# Patient Record
Sex: FEMALE | Race: AMERICAN INDIAN OR ALASKA NATIVE | NOT HISPANIC OR LATINO | Employment: PART TIME | URBAN - NONMETROPOLITAN AREA
[De-identification: names, ages, dates, MRNs, and addresses within clinical notes are randomized per-mention and may not be internally consistent; named-entity substitution may affect disease eponyms.]

---

## 2017-04-20 ENCOUNTER — HOSPITAL ENCOUNTER (OUTPATIENT)
Dept: ULTRASOUND IMAGING | Facility: HOSPITAL | Age: 22
Discharge: HOME | End: 2017-04-20
Attending: FAMILY MEDICINE | Admitting: FAMILY MEDICINE
Payer: COMMERCIAL

## 2017-04-20 DIAGNOSIS — Z34.01 SUPERVISION OF NORMAL FIRST PREGNANCY IN FIRST TRIMESTER: Primary | ICD-10-CM

## 2017-04-20 PROCEDURE — 76801 OB US < 14 WKS SINGLE FETUS: CPT | Mod: TC

## 2017-04-21 DIAGNOSIS — Z34.02 SUPERVISION OF NORMAL FIRST PREGNANCY IN SECOND TRIMESTER: Primary | ICD-10-CM

## 2017-10-02 ENCOUNTER — HOSPITAL ENCOUNTER (EMERGENCY)
Facility: HOSPITAL | Age: 22
End: 2017-10-02
Payer: COMMERCIAL

## 2017-10-02 ENCOUNTER — HOSPITAL ENCOUNTER (OUTPATIENT)
Facility: HOSPITAL | Age: 22
Discharge: HOME | End: 2017-10-02
Attending: OBSTETRICS & GYNECOLOGY | Admitting: OBSTETRICS & GYNECOLOGY
Payer: COMMERCIAL

## 2017-10-02 VITALS
DIASTOLIC BLOOD PRESSURE: 70 MMHG | HEIGHT: 68 IN | BODY MASS INDEX: 44.41 KG/M2 | SYSTOLIC BLOOD PRESSURE: 124 MMHG | TEMPERATURE: 97.8 F | RESPIRATION RATE: 20 BRPM | WEIGHT: 293 LBS

## 2017-10-02 PROBLEM — Z36.89 ENCOUNTER FOR TRIAGE IN PREGNANT PATIENT: Status: ACTIVE | Noted: 2017-10-02

## 2017-10-02 LAB
A1 MICROGLOB PLACENTAL VAG QL: POSITIVE
ALBUMIN UR-MCNC: 30 MG/DL
APPEARANCE UR: CLEAR
BACTERIA #/AREA URNS HPF: ABNORMAL /HPF
BILIRUB UR QL STRIP: ABNORMAL
COLOR UR AUTO: ABNORMAL
GLUCOSE UR STRIP-MCNC: NEGATIVE MG/DL
HGB UR QL STRIP: NEGATIVE
KETONES UR STRIP-MCNC: NEGATIVE MG/DL
LEUKOCYTE ESTERASE UR QL STRIP: NEGATIVE
MUCOUS THREADS #/AREA URNS LPF: PRESENT /LPF
NITRATE UR QL: NEGATIVE
PH UR STRIP: 7 PH (ref 4.7–8)
RBC #/AREA URNS AUTO: 1 /HPF (ref 0–2)
SOURCE: ABNORMAL
SP GR UR STRIP: 1.02 (ref 1–1.03)
SQUAMOUS #/AREA URNS AUTO: 12 /HPF (ref 0–1)
UROBILINOGEN UR STRIP-MCNC: NORMAL MG/DL (ref 0–2)
WBC #/AREA URNS AUTO: 10 /HPF (ref 0–2)

## 2017-10-02 PROCEDURE — 99214 OFFICE O/P EST MOD 30 MIN: CPT

## 2017-10-02 PROCEDURE — 99213 OFFICE O/P EST LOW 20 MIN: CPT

## 2017-10-02 PROCEDURE — 84112 EVAL AMNIOTIC FLUID PROTEIN: CPT | Performed by: OBSTETRICS & GYNECOLOGY

## 2017-10-02 PROCEDURE — 76815 OB US LIMITED FETUS(S): CPT | Mod: TC

## 2017-10-02 PROCEDURE — 81001 URINALYSIS AUTO W/SCOPE: CPT | Performed by: OBSTETRICS & GYNECOLOGY

## 2017-10-02 NOTE — PLAN OF CARE
Ultrasound done, no fetal heart tones noted.  Dr Lockhart paged to the room at 1350, updated no fetal heart tones, per ultrasound.  Dr Lockhart stated she would come to see pt.   Dr Lockhart in the room at 1405 and updated the patient and significant other of the ultrasound results.  Dr Lockhart stated she spoke with Dr Mancuso, pt's primary MD.  Dr Lockhart stated she called Dr Mancuso and updated her of pt's status and Dr Mancuso said to send pt to [place].  Pt discharged and accompanied downstairs by myself and pt's family at 1414.  Pt offered a wheelchair, but declined.  This writer called [facility] and updated Jaleesa of pt's status and that she left our facility at 1414.

## 2017-10-02 NOTE — PLAN OF CARE
OB Triage Note  Johanny Infante  MRN: [id number]  Gestational Age: 38 5/7  Johanny Infante presents for  Abdominal pain that is occurring every 7-8 minutes (sign/symptom/concern).      States anup since 1130.  Rates pain at  10/10.  Pt declines any vaginal bleeding, is unsure if she is leaking fluid.  Dr Lockhart notified of arrival and condition.  Oriented patient to surroundings. Call light within reach.     FHT: difficulty finding FHTs, continually adjusting monitor and unable to detect FHTs, another RN in the room and attempted.  Dr Lockhart paged and updated.  Order obtained for an ultrasound.  Ultrasound department called and stated they will be up to see    Uterine Assessment:Contractions: occurring every 2-7minutes, lasting 50-70 seconds.  Contractions palpate moderate.     Plan:  - Awaiting U/S, plan to do a BPP as well  -Sterile vaginal exam/sterile speculum exam

## 2017-10-02 NOTE — IP AVS SNAPSHOT
[facility]    [address] [address]    Phone:  [phone]    Fax:  [phone]                                       After Visit Summary   10/2/2017    Johanny Infante    MRN: [id number]           After Visit Summary Signature Page     I have received my discharge instructions, and my questions have been answered. I have discussed any challenges I see with this plan with the nurse or doctor.    ..........................................................................................................................................  Patient/Patient Representative Signature      ..........................................................................................................................................  Patient Representative Print Name and Relationship to Patient    ..................................................               ................................................  Date                                            Time    ..........................................................................................................................................  Reviewed by Signature/Title    ...................................................              ..............................................  Date                                                            Time

## 2017-10-02 NOTE — IP AVS SNAPSHOT
"                  MRN:[id number]                      After Visit Summary   10/2/2017    Johanny Infante    MRN: [id number]           Thank you!     Thank you for choosing [facility] for your care. Our goal is always to provide you with excellent care. Hearing back from our patients is one way we can continue to improve our services. Please take a few minutes to complete the written survey that you may receive in the mail after you visit with us. Thank you!        Patient Information     Date Of Birth          1995        About your hospital stay     You were admitted on:  October 2, 2017 You last received care in the:  [facility]    You were discharged on:  October 2, 2017       Who to Call     For medical emergencies, please call 911.  For non-urgent questions about your medical care, please call your primary care provider or clinic, [phone]          Attending Provider     Provider Specialty    Jessica Lockhart MD OB/Gyn       Primary Care Provider Office Phone # Fax #    Alesia Benton [phone]      Pending Results     Date and Time Order Name Status Description    10/2/2017 1312 US OB Limited One Or More Fetuses In process             Admission Information     Date & Time Provider Department Dept. Phone    10/2/2017 Jessica Lockhart MD [facility] [phone]      Your Vitals Were     Blood Pressure Temperature Respirations Height Weight BMI (Body Mass Index)    124/70 97.8  F (36.6  C) (Axillary) 20 1.727 m (5' 8\") 141.5 kg (312 lb) 47.44 kg/m2      [facility] [employer] Information     [employer] lets you send messages to your doctor, view your test results, renew your prescriptions, schedule appointments and more. To sign up, go to [email or web address] . Click on \"Log in\" on the left side of the screen, which will take you to the Welcome page. Then click on \"Sign up Now\" on the right side of the page.     You will be asked to enter the access code listed below, as " well as some personal information. Please follow the directions to create your username and password.     Your access code is: [email or web address]  Expires: 2017  2:09 PM     Your access code will  in 90 days. If you need help or a new code, please call your [place] clinic or [phone].        Care EveryWhere ID     This is your Care EveryWhere ID. This could be used by other organizations to access your [place] medical records  [id number]        Equal Access to Services     KEITH ESTES : Hadii haily alcazar hadasho Soomaali, waaxda luqadaha, qaybta kaalmada adeegyada, salas jimenez . So [facility] [phone].    ATENCIÓN: Si habla español, tiene a laird disposición servicios gratuitos de asistencia lingüística. Llame al [phone].    We comply with applicable federal civil rights laws and [place] laws. We do not discriminate on the basis of race, color, national origin, age, disability, sex, sexual orientation, or gender identity.               Review of your medicines      UNREVIEWED medicines. Ask your doctor about these medicines        Dose / Directions    ibuprofen 200 MG capsule        Dose:  800 mg   Take 800 mg by mouth every 8 hours as needed   Refills:  0       KEFLEX PO        Dose:  500 mg   Take 500 mg by mouth 4 times daily Pt unsure of dosage and has not started yet   Refills:  0       ketorolac 10 MG tablet   Commonly known as:  TORADOL        Dose:  10 mg   Take 1 tablet (10 mg) by mouth every 6 hours as needed for moderate pain   Quantity:  20 tablet   Refills:  0       oxyCODONE-acetaminophen 5-325 MG per tablet   Commonly known as:  PERCOCET        Dose:  1-2 tablet   Take 1-2 tablets by mouth every 4 hours as needed for moderate to severe pain   Quantity:  30 tablet   Refills:  0                Protect others around you: Learn how to safely use, store and throw away your medicines at www.disposemymeds.org.             Medication List: This is a list of all your  medications and when to take them. Check marks below indicate your daily home schedule. Keep this list as a reference.      Medications           Morning Afternoon Evening Bedtime As Needed    ibuprofen 200 MG capsule   Take 800 mg by mouth every 8 hours as needed                                KEFLEX PO   Take 500 mg by mouth 4 times daily Pt unsure of dosage and has not started yet                                ketorolac 10 MG tablet   Commonly known as:  TORADOL   Take 1 tablet (10 mg) by mouth every 6 hours as needed for moderate pain                                oxyCODONE-acetaminophen 5-325 MG per tablet   Commonly known as:  PERCOCET   Take 1-2 tablets by mouth every 4 hours as needed for moderate to severe pain                                          More Information        Stillbirth  Stillbirth is when a baby dies in the womb after 20 weeks or more of growing.  What causes stillbirth?  Stillbirth can be caused by many things, such as:    Diabetes or high blood pressure in the mother    An infection in the mother or in the baby    Birth defects because of genetic problems or other causes    Fetal growth restriction    Mismatched blood proteins between mother and baby (Rh disease)    A problem with the umbilical cord. This includes knots, a too-tight cord, a cord wrapped around the baby s body or neck, or the cord dropping through the open cervix after the membranes have ruptured (cord prolapse).    A problem with the placenta, such as poor blood supply, or a shared placenta between twins (twin-to-twin transfusion)  Symptoms of stillbirth  Some symptoms of stillbirth can include:    Stopping of the baby s movement and kicks    Light to heavy bleeding    No baby heartbeat heard with a stethoscope or Doppler  Diagnosing stillbirth  Stillbirth is diagnosed with an ultrasound test. The test shows lack of movement and heartbeat of the baby. The ultrasound test may also help the healthcare provider understand  why the baby . Blood tests may also be done to see what caused the stillbirth. The placenta and baby may be examined after delivery to learn more about the cause.  Treatment for stillbirth  Treatment of a woman after stillbirth varies. It depends on factors such as how long the baby has been in the womb, the size of the baby, and how much time has passed since the baby s heartbeat stopped. Treatment may be done by one of these methods:    Waiting for labor to happen on its own    Dilating the cervix and using tools to deliver the baby    Inducing labor using medicine to open the cervix and cause the uterus to contract and deliver the baby  Coping with stillbirth  Stillbirth is very difficult for the parents, and often other family members. It can be more upsetting than an early miscarriage because the mother has felt the baby move. It can be very hard to go through labor, yet not have a baby to take home. Counseling is important for all parents coping with a stillbirth. It can help you understand your feelings and begin the work of grieving. Talk with your healthcare provider to be referred to a counselor with experience in pregnancy loss.  Grieving the loss of your child  Give yourself time to grieve the loss of your baby. There are ways to help you move through the grieving process. You may wish to hold and touch the baby. In a private room, a nurse or counselor will bring the baby to you, wrapped in a blanket. This can give you a real memory of your baby. Seeing your baby can be helpful when there is a birth defect. Sometimes, a parent may imagine a birth defect as much worse than the real problem. You may also wish to take photographs or footprints of your baby, or take a lock of hair to keep. You may wish to remember the baby with a memorial or  service. This can also help friends and other family members understand the loss that you have experienced.  Learning more about the cause of death  Some  parents may wish to learn more about the cause of their baby's death. An autopsy or special genetic and chromosomal testing may be options. Talk with your healthcare provider. Results can be shared at a meeting with your healthcare provider several weeks afterward. Autopsy does not prevent you from being able to see or hold the baby, and can be done before a .     When to call your healthcare provider  Call your healthcare provider right away if you have any of these:    Fever of 100.4 F (38 C) or higher    Pain or bleeding    Symptoms of depression    Other symptoms as advised   Date Last Reviewed: 2016-2017 [employer]. [address] [place] [address], [place] [address]. All rights reserved. This information is not intended as a substitute for professional medical care. Always follow your healthcare professional's instructions.

## 2017-11-20 ENCOUNTER — HOSPITAL ENCOUNTER (EMERGENCY)
Facility: HOSPITAL | Age: 22
Discharge: LEFT BEFORE DISCHARGE | End: 2017-11-20
Admitting: PHYSICIAN ASSISTANT
Payer: COMMERCIAL

## 2017-11-20 VITALS
WEIGHT: 260 LBS | DIASTOLIC BLOOD PRESSURE: 88 MMHG | RESPIRATION RATE: 20 BRPM | OXYGEN SATURATION: 100 % | SYSTOLIC BLOOD PRESSURE: 138 MMHG | BODY MASS INDEX: 39.4 KG/M2 | TEMPERATURE: 97.6 F | HEIGHT: 68 IN

## 2017-11-20 PROCEDURE — 40000268 ZZH STATISTIC NO CHARGES

## 2019-03-18 ENCOUNTER — HOSPITAL ENCOUNTER (EMERGENCY)
Facility: HOSPITAL | Age: 24
Discharge: HOME | End: 2019-03-18
Attending: NURSE PRACTITIONER | Admitting: NURSE PRACTITIONER
Payer: COMMERCIAL

## 2019-03-18 VITALS
OXYGEN SATURATION: 97 % | TEMPERATURE: 98.6 F | RESPIRATION RATE: 16 BRPM | DIASTOLIC BLOOD PRESSURE: 86 MMHG | SYSTOLIC BLOOD PRESSURE: 141 MMHG

## 2019-03-18 DIAGNOSIS — T50.905A ADVERSE EFFECT OF DRUG, INITIAL ENCOUNTER: ICD-10-CM

## 2019-03-18 DIAGNOSIS — S93.402A SPRAIN OF LEFT ANKLE, UNSPECIFIED LIGAMENT, INITIAL ENCOUNTER: ICD-10-CM

## 2019-03-18 PROCEDURE — G0463 HOSPITAL OUTPT CLINIC VISIT: HCPCS

## 2019-03-18 PROCEDURE — 99203 OFFICE O/P NEW LOW 30 MIN: CPT | Mod: Z6 | Performed by: NURSE PRACTITIONER

## 2019-03-18 RX ORDER — ESCITALOPRAM OXALATE 20 MG/1
20 TABLET ORAL DAILY
COMMUNITY
End: 2020-07-19

## 2019-03-18 NOTE — DISCHARGE INSTRUCTIONS
Follow up with Dr. Mancuso tomorrow about Lovenox (before your next dose is due) and let her know the itching and rash is suspicious for a reaction to the Lovenox.  You may take 1-2 over the counter Benadryl tonight for itching.  Follow up with Dr. Benton if ankle pain is not improving after 2-3 days.  Use crutches, and avoid bearing weight on left ankle.  Keep left ankle elevated and use ice for swelling.  You may take Tylenol as needed for pain.

## 2019-03-18 NOTE — ED AVS SNAPSHOT
[facility]  [address]  [place] [address]  Phone:  [phone]                                    Johanny Infante   MRN: [id number]    Department:  [facility]   Date of Visit:  3/18/2019           After Visit Summary Signature Page    I have received my discharge instructions, and my questions have been answered. I have discussed any challenges I see with this plan with the nurse or doctor.    ..........................................................................................................................................  Patient/Patient Representative Signature      ..........................................................................................................................................  Patient Representative Print Name and Relationship to Patient    ..................................................               ................................................  Date                                   Time    ..........................................................................................................................................  Reviewed by Signature/Title    ...................................................              ..............................................  Date                                               Time          22EPIC Rev 08/18

## 2019-03-18 NOTE — ED TRIAGE NOTES
"C/o possible allergic to reaction to her lovenox injection. States started about a week ago. And she is having \"itching hot skin\". Also c/o left ankle pain, states twisted it on her way here, states \"I think it's fine, it's just sore\"  "

## 2019-03-18 NOTE — ED PROVIDER NOTES
"  History     Chief Complaint   Patient presents with     Ankle Pain     c/o lt ankle pain due to slip and fall     Allergic Reaction     concerned about a possible allergic reaction due to itchy and has a h/a     The history is provided by the patient. No  was used.     Johanny Infante is a 23 year old female who was started on Lovenox by her OB about 1 week ago as a preventative due to history of stillbirth and \"one of the factors for Lupus.\" She has had an \"under the skin prickly itch\" since starting it. The itching is worse right after taking a dose. No rash or hives, although her skin gets red from scratching. She has a headache after each dose as well. She has been trying lotion (hand moisturizing lotion), which doesn't help. Entire body is pruritic.    On her way into  today, she slipped and fell, everting her left ankle. She has pain and swelling, but is able to bear weight.     She has been feeling her baby move as usual. No abdominal pain or cramping.    Allergies:  No Known Allergies    Problem List:    Patient Active Problem List    Diagnosis Date Noted     Encounter for triage in pregnant patient 10/02/2017     Priority: Medium     Uncontrolled pain 05/19/2015     Priority: Medium     Tobacco abuse      Priority: Medium     Obesity      Priority: Medium        Past Medical History:    Past Medical History:   Diagnosis Date     Obesity      Tobacco abuse        Past Surgical History:    Past Surgical History:   Procedure Laterality Date     ENT SURGERY      tonsils and adnoids     wisdom teeth[         Family History:    Family History   Problem Relation Age of Onset     Diabetes Mother        Social History:  Marital Status:  Single [1]  Social History     Tobacco Use     Smoking status: Current Every Day Smoker     Packs/day: 0.40     Smokeless tobacco: Never Used   Substance Use Topics     Alcohol use: No     Drug use: No        Medications:      aspirin (ASPIRIN LOW DOSE) 81 MG " "tablet   enoxaparin (LOVENOX) 30 MG/0.3ML syringe   escitalopram (LEXAPRO) 20 MG tablet         Review of Systems   Constitutional: Negative for fever.   Respiratory: Negative for shortness of breath.    Cardiovascular: Negative for chest pain.   Gastrointestinal: Negative for abdominal pain.   Musculoskeletal:        Positive for left ankle pain   Skin: Positive for rash.   Neurological: Positive for headaches. Negative for dizziness, syncope and light-headedness.   All other systems reviewed and are negative.      Physical Exam   BP: 141/86  Heart Rate: 94  Temp: 98.6  F (37  C)  Resp: 16  SpO2: 97 %      Physical Exam   Constitutional: She is oriented to person, place, and time. She appears well-developed and well-nourished. No distress.   HENT:   Head: Normocephalic and atraumatic.   Eyes: No scleral icterus.   Neck: Normal range of motion. Neck supple.   Musculoskeletal:        Left ankle: She exhibits decreased range of motion, swelling and ecchymosis. She exhibits no deformity and normal pulse. Tenderness. Lateral malleolus tenderness found.   Tenderness and bruising inferior to lateral malleolus.   Neurological: She is alert and oriented to person, place, and time.   Skin: Skin is warm and dry. Rash noted. Rash is urticarial. She is not diaphoretic. No erythema. No pallor.       ED Course        Procedures    Johanny declines an X-ray today due to pregnancy, states, \"I just don't want to take the chance.\"     Left ankle sprain vs fracture, although Johanny is able to bear weight. Firm stirrup splint placed and crutches given. Advised to avoid weight bearing, elevate and ice to ankle. Follow up with PCP if ankle pain is not improving after 2-3 days, sooner if worsening. Johanny is in agreement with the plan and is discharged to home in stable condition.    No results found for this or any previous visit (from the past 24 hour(s)).    Medications - No data to display    Assessments & Plan (with Medical Decision " Making)     I have reviewed the nursing notes.    I have reviewed the findings, diagnosis, plan and need for follow up with the patient.   Rash appears urticarial. Advised to take a dose of Benadryl at home tonight for pruritis, and to contact OB provider tomorrow morning before next dose of Lovenox to discuss options.     Left ankle sprain vs fracture.        Medication List      There are no discharge medications for this visit.         Final diagnoses:   Sprain of left ankle, unspecified ligament, initial encounter   Adverse effect of drug, initial encounter       3/18/2019   [facility]     Luz Camarillo, APRN CNP  03/19/19 2149

## 2019-03-19 ASSESSMENT — ENCOUNTER SYMPTOMS
SHORTNESS OF BREATH: 0
LIGHT-HEADEDNESS: 0
DIZZINESS: 0
FEVER: 0
ABDOMINAL PAIN: 0
HEADACHES: 1

## 2019-06-03 ENCOUNTER — APPOINTMENT (OUTPATIENT)
Dept: CT IMAGING | Facility: HOSPITAL | Age: 24
End: 2019-06-03
Attending: PHYSICIAN ASSISTANT
Payer: COMMERCIAL

## 2019-06-03 ENCOUNTER — HOSPITAL ENCOUNTER (EMERGENCY)
Facility: HOSPITAL | Age: 24
Discharge: HOME | End: 2019-06-03
Attending: PHYSICIAN ASSISTANT | Admitting: PHYSICIAN ASSISTANT
Payer: COMMERCIAL

## 2019-06-03 ENCOUNTER — HOSPITAL ENCOUNTER (INPATIENT)
Facility: HOSPITAL | Age: 24
LOS: 2 days | Discharge: HOME | End: 2019-06-06
Attending: FAMILY MEDICINE | Admitting: OBSTETRICS & GYNECOLOGY
Payer: COMMERCIAL

## 2019-06-03 VITALS
SYSTOLIC BLOOD PRESSURE: 116 MMHG | RESPIRATION RATE: 16 BRPM | TEMPERATURE: 99.4 F | OXYGEN SATURATION: 97 % | HEART RATE: 117 BPM | DIASTOLIC BLOOD PRESSURE: 64 MMHG

## 2019-06-03 DIAGNOSIS — R52 INTRACTABLE PAIN: ICD-10-CM

## 2019-06-03 DIAGNOSIS — Z98.890 POSTOPERATIVE STATE: ICD-10-CM

## 2019-06-03 DIAGNOSIS — N93.9 VAGINAL BLEEDING: ICD-10-CM

## 2019-06-03 DIAGNOSIS — N71.9 ENDOMETRITIS: Primary | ICD-10-CM

## 2019-06-03 LAB
ALBUMIN SERPL-MCNC: 2.6 G/DL (ref 3.4–5)
ALBUMIN UR-MCNC: NEGATIVE MG/DL
ALP SERPL-CCNC: 177 U/L (ref 40–150)
ALT SERPL W P-5'-P-CCNC: 27 U/L (ref 0–50)
ANION GAP SERPL CALCULATED.3IONS-SCNC: 8 MMOL/L (ref 3–14)
APPEARANCE UR: CLEAR
AST SERPL W P-5'-P-CCNC: 14 U/L (ref 0–45)
BACTERIA #/AREA URNS HPF: ABNORMAL /HPF
BASOPHILS # BLD AUTO: 0 10E9/L (ref 0–0.2)
BASOPHILS # BLD AUTO: 0 10E9/L (ref 0–0.2)
BASOPHILS NFR BLD AUTO: 0.1 %
BASOPHILS NFR BLD AUTO: 0.2 %
BILIRUB SERPL-MCNC: 0.3 MG/DL (ref 0.2–1.3)
BILIRUB UR QL STRIP: NEGATIVE
BUN SERPL-MCNC: 14 MG/DL (ref 7–30)
CALCIUM SERPL-MCNC: 8.3 MG/DL (ref 8.5–10.1)
CHLORIDE SERPL-SCNC: 106 MMOL/L (ref 94–109)
CO2 SERPL-SCNC: 25 MMOL/L (ref 20–32)
COLOR UR AUTO: ABNORMAL
CREAT SERPL-MCNC: 0.74 MG/DL (ref 0.52–1.04)
DIFFERENTIAL METHOD BLD: ABNORMAL
DIFFERENTIAL METHOD BLD: ABNORMAL
EOSINOPHIL # BLD AUTO: 0.2 10E9/L (ref 0–0.7)
EOSINOPHIL # BLD AUTO: 0.3 10E9/L (ref 0–0.7)
EOSINOPHIL NFR BLD AUTO: 1.2 %
EOSINOPHIL NFR BLD AUTO: 2.4 %
ERYTHROCYTE [DISTWIDTH] IN BLOOD BY AUTOMATED COUNT: 13.5 % (ref 10–15)
ERYTHROCYTE [DISTWIDTH] IN BLOOD BY AUTOMATED COUNT: 13.6 % (ref 10–15)
GFR SERPL CREATININE-BSD FRML MDRD: >90 ML/MIN/{1.73_M2}
GLUCOSE SERPL-MCNC: 90 MG/DL (ref 70–99)
GLUCOSE UR STRIP-MCNC: NEGATIVE MG/DL
HCT VFR BLD AUTO: 28.3 % (ref 35–47)
HCT VFR BLD AUTO: 29.8 % (ref 35–47)
HGB BLD-MCNC: 9.2 G/DL (ref 11.7–15.7)
HGB BLD-MCNC: 9.6 G/DL (ref 11.7–15.7)
HGB UR QL STRIP: ABNORMAL
IMM GRANULOCYTES # BLD: 0.1 10E9/L (ref 0–0.4)
IMM GRANULOCYTES # BLD: 0.1 10E9/L (ref 0–0.4)
IMM GRANULOCYTES NFR BLD: 0.7 %
IMM GRANULOCYTES NFR BLD: 0.7 %
KETONES UR STRIP-MCNC: NEGATIVE MG/DL
LACTATE BLD-SCNC: 0.8 MMOL/L (ref 0.7–2)
LEUKOCYTE ESTERASE UR QL STRIP: ABNORMAL
LYMPHOCYTES # BLD AUTO: 0.9 10E9/L (ref 0.8–5.3)
LYMPHOCYTES # BLD AUTO: 1.5 10E9/L (ref 0.8–5.3)
LYMPHOCYTES NFR BLD AUTO: 11.5 %
LYMPHOCYTES NFR BLD AUTO: 6.8 %
MCH RBC QN AUTO: 28.2 PG (ref 26.5–33)
MCH RBC QN AUTO: 28.2 PG (ref 26.5–33)
MCHC RBC AUTO-ENTMCNC: 32.2 G/DL (ref 31.5–36.5)
MCHC RBC AUTO-ENTMCNC: 32.5 G/DL (ref 31.5–36.5)
MCV RBC AUTO: 87 FL (ref 78–100)
MCV RBC AUTO: 88 FL (ref 78–100)
MONOCYTES # BLD AUTO: 0.6 10E9/L (ref 0–1.3)
MONOCYTES # BLD AUTO: 0.9 10E9/L (ref 0–1.3)
MONOCYTES NFR BLD AUTO: 4.7 %
MONOCYTES NFR BLD AUTO: 6.6 %
MUCOUS THREADS #/AREA URNS LPF: PRESENT /LPF
NEUTROPHILS # BLD AUTO: 10.6 10E9/L (ref 1.6–8.3)
NEUTROPHILS # BLD AUTO: 11.4 10E9/L (ref 1.6–8.3)
NEUTROPHILS NFR BLD AUTO: 78.7 %
NEUTROPHILS NFR BLD AUTO: 86.4 %
NITRATE UR QL: NEGATIVE
NRBC # BLD AUTO: 0 10*3/UL
NRBC # BLD AUTO: 0 10*3/UL
NRBC BLD AUTO-RTO: 0 /100
NRBC BLD AUTO-RTO: 0 /100
PH UR STRIP: 7 PH (ref 4.7–8)
PLATELET # BLD AUTO: 397 10E9/L (ref 150–450)
PLATELET # BLD AUTO: 429 10E9/L (ref 150–450)
POTASSIUM SERPL-SCNC: 4.1 MMOL/L (ref 3.4–5.3)
PROCALCITONIN SERPL-MCNC: <0.05 NG/ML
PROT SERPL-MCNC: 6.8 G/DL (ref 6.8–8.8)
RBC # BLD AUTO: 3.26 10E12/L (ref 3.8–5.2)
RBC # BLD AUTO: 3.4 10E12/L (ref 3.8–5.2)
RBC #/AREA URNS AUTO: 12 /HPF (ref 0–2)
SODIUM SERPL-SCNC: 139 MMOL/L (ref 133–144)
SOURCE: ABNORMAL
SP GR UR STRIP: 1.01 (ref 1–1.03)
UROBILINOGEN UR STRIP-MCNC: NORMAL MG/DL (ref 0–2)
WBC # BLD AUTO: 13.2 10E9/L (ref 4–11)
WBC # BLD AUTO: 13.4 10E9/L (ref 4–11)
WBC #/AREA URNS AUTO: 6 /HPF (ref 0–5)

## 2019-06-03 PROCEDURE — 83605 ASSAY OF LACTIC ACID: CPT | Performed by: PHYSICIAN ASSISTANT

## 2019-06-03 PROCEDURE — 36415 COLL VENOUS BLD VENIPUNCTURE: CPT | Performed by: PHYSICIAN ASSISTANT

## 2019-06-03 PROCEDURE — 86901 BLOOD TYPING SEROLOGIC RH(D): CPT | Performed by: FAMILY MEDICINE

## 2019-06-03 PROCEDURE — 85025 COMPLETE CBC W/AUTO DIFF WBC: CPT | Performed by: FAMILY MEDICINE

## 2019-06-03 PROCEDURE — 99285 EMERGENCY DEPT VISIT HI MDM: CPT | Mod: Z6 | Performed by: FAMILY MEDICINE

## 2019-06-03 PROCEDURE — 99285 EMERGENCY DEPT VISIT HI MDM: CPT | Mod: 25

## 2019-06-03 PROCEDURE — 83605 ASSAY OF LACTIC ACID: CPT | Performed by: FAMILY MEDICINE

## 2019-06-03 PROCEDURE — 80053 COMPREHEN METABOLIC PANEL: CPT | Performed by: PHYSICIAN ASSISTANT

## 2019-06-03 PROCEDURE — 87086 URINE CULTURE/COLONY COUNT: CPT | Performed by: PHYSICIAN ASSISTANT

## 2019-06-03 PROCEDURE — 25000128 H RX IP 250 OP 636: Performed by: PHYSICIAN ASSISTANT

## 2019-06-03 PROCEDURE — 96375 TX/PRO/DX INJ NEW DRUG ADDON: CPT

## 2019-06-03 PROCEDURE — 96365 THER/PROPH/DIAG IV INF INIT: CPT | Mod: XU

## 2019-06-03 PROCEDURE — 86900 BLOOD TYPING SEROLOGIC ABO: CPT | Performed by: FAMILY MEDICINE

## 2019-06-03 PROCEDURE — 80053 COMPREHEN METABOLIC PANEL: CPT | Performed by: FAMILY MEDICINE

## 2019-06-03 PROCEDURE — 86850 RBC ANTIBODY SCREEN: CPT | Performed by: FAMILY MEDICINE

## 2019-06-03 PROCEDURE — 25500064 ZZH RX 255 OP 636: Performed by: RADIOLOGY

## 2019-06-03 PROCEDURE — 74177 CT ABD & PELVIS W/CONTRAST: CPT | Mod: TC

## 2019-06-03 PROCEDURE — 87040 BLOOD CULTURE FOR BACTERIA: CPT | Mod: 59 | Performed by: PHYSICIAN ASSISTANT

## 2019-06-03 PROCEDURE — 25800030 ZZH RX IP 258 OP 636: Performed by: PHYSICIAN ASSISTANT

## 2019-06-03 PROCEDURE — 85025 COMPLETE CBC W/AUTO DIFF WBC: CPT | Performed by: PHYSICIAN ASSISTANT

## 2019-06-03 PROCEDURE — 81001 URINALYSIS AUTO W/SCOPE: CPT | Performed by: PHYSICIAN ASSISTANT

## 2019-06-03 PROCEDURE — 25000128 H RX IP 250 OP 636: Performed by: FAMILY MEDICINE

## 2019-06-03 PROCEDURE — 84145 PROCALCITONIN (PCT): CPT | Performed by: PHYSICIAN ASSISTANT

## 2019-06-03 RX ORDER — AMPICILLIN AND SULBACTAM 2; 1 G/1; G/1
3 INJECTION, POWDER, FOR SOLUTION INTRAMUSCULAR; INTRAVENOUS ONCE
Status: DISCONTINUED | OUTPATIENT
Start: 2019-06-03 | End: 2019-06-03

## 2019-06-03 RX ORDER — OXYCODONE HYDROCHLORIDE 5 MG/1
5-10 TABLET ORAL
COMMUNITY
Start: 2019-05-30 | End: 2020-07-19

## 2019-06-03 RX ORDER — SODIUM CHLORIDE 9 MG/ML
1000 INJECTION, SOLUTION INTRAVENOUS CONTINUOUS
Status: DISCONTINUED | OUTPATIENT
Start: 2019-06-03 | End: 2019-06-03 | Stop reason: HOSPADM

## 2019-06-03 RX ORDER — KETOROLAC TROMETHAMINE 30 MG/ML
30 INJECTION, SOLUTION INTRAMUSCULAR; INTRAVENOUS ONCE
Status: COMPLETED | OUTPATIENT
Start: 2019-06-03 | End: 2019-06-03

## 2019-06-03 RX ORDER — IOPAMIDOL 612 MG/ML
100 INJECTION, SOLUTION INTRAVASCULAR ONCE
Status: COMPLETED | OUTPATIENT
Start: 2019-06-03 | End: 2019-06-03

## 2019-06-03 RX ORDER — SODIUM CHLORIDE 9 MG/ML
1000 INJECTION, SOLUTION INTRAVENOUS CONTINUOUS
Status: DISCONTINUED | OUTPATIENT
Start: 2019-06-03 | End: 2019-06-06 | Stop reason: HOSPADM

## 2019-06-03 RX ADMIN — IOPAMIDOL 100 ML: 612 INJECTION, SOLUTION INTRAVENOUS at 11:59

## 2019-06-03 RX ADMIN — SODIUM CHLORIDE 1000 ML: 9 INJECTION, SOLUTION INTRAVENOUS at 23:59

## 2019-06-03 RX ADMIN — SODIUM CHLORIDE 1000 ML: 9 INJECTION, SOLUTION INTRAVENOUS at 10:38

## 2019-06-03 RX ADMIN — KETOROLAC TROMETHAMINE 30 MG: 30 INJECTION, SOLUTION INTRAMUSCULAR; INTRAVENOUS at 11:49

## 2019-06-03 RX ADMIN — SODIUM CHLORIDE 1000 ML: 9 INJECTION, SOLUTION INTRAVENOUS at 11:41

## 2019-06-03 RX ADMIN — SODIUM CHLORIDE 3 G: 9 INJECTION, SOLUTION INTRAVENOUS at 12:23

## 2019-06-03 ASSESSMENT — ENCOUNTER SYMPTOMS
NEUROLOGICAL NEGATIVE: 1
ABDOMINAL PAIN: 1
RESPIRATORY NEGATIVE: 1
BRUISES/BLEEDS EASILY: 1
CHILLS: 1
CARDIOVASCULAR NEGATIVE: 1
APPETITE CHANGE: 0
NAUSEA: 0
PSYCHIATRIC NEGATIVE: 1
EYES NEGATIVE: 1
FATIGUE: 0
DIAPHORESIS: 0
ACTIVITY CHANGE: 0
FEVER: 1
VOMITING: 0

## 2019-06-03 NOTE — ED AVS SNAPSHOT
[facility]  [address]  [place] [address]  Phone:  [phone]                                    Johanny Infante   MRN: [id number]    Department:  [facility]   Date of Visit:  6/3/2019           After Visit Summary Signature Page    I have received my discharge instructions, and my questions have been answered. I have discussed any challenges I see with this plan with the nurse or doctor.    ..........................................................................................................................................  Patient/Patient Representative Signature      ..........................................................................................................................................  Patient Representative Print Name and Relationship to Patient    ..................................................               ................................................  Date                                   Time    ..........................................................................................................................................  Reviewed by Signature/Title    ...................................................              ..............................................  Date                                               Time          22EPIC Rev 08/18

## 2019-06-03 NOTE — ED NOTES
Ambulated to room 6 independently, gown placed, call light in reach.   in [place] on .  This morning noted some bleeding and drainage from incision site.  Fever today in triage.  Lower abd pain, rates pain at 8, pain goal is zero.  Intermittent itching at incision site.  Headache and body aches.  Breast feeding, denies problem with breast feeding.  Daily Lovenox injection, clotting disorder.  Tylenol 1000 mg at 0100, Ibuprofen 800 mg at 0600, oxycodone at 0450.

## 2019-06-03 NOTE — ED NOTES
Dr. Herbert Goncalves notified,sepsis alert triggered, stated she will access patient.   Temp 99.0 oral.

## 2019-06-03 NOTE — DISCHARGE INSTRUCTIONS
Start Augmentin tonight with supper.     Please follow-up with your primary care provider or OB tomorrow for recheck.     Please return here for ANY worsening symptoms, new symptoms, or other concerns.

## 2019-06-03 NOTE — ED TRIAGE NOTES
Pt in for complaints of drainage from  incision. Reports she has a  on  and reports no issues on 1 week appt. Reports this am she noticed bloody drainage on her underwear at the incision line.

## 2019-06-03 NOTE — ED PROVIDER NOTES
History     Chief Complaint   Patient presents with     Wound Check     The history is provided by the patient.     Johanny Infante is a 24 year old female who presented to the emergency department ambulatory for evaluation of abdominal pain past medical history significant for  on May 22.  She did well for approximately 1 week postpartum and has recently developed some lower abdominal pain, discharge from the  wound, and a recent fever.  He presented to the emergency department with a fever of 109.9.  Past medical history is most significant for antiphospholipid antibody syndrome currently on Lovenox.  She denies any chest pain or cough.  Denies any shortness of breath.  Denies any lower urinary tract symptoms.  Denies any diarrhea.  Denies any flank pain.    Allergies:  No Known Allergies    Problem List:    Patient Active Problem List    Diagnosis Date Noted     Encounter for triage in pregnant patient 10/02/2017     Priority: Medium     Uncontrolled pain 2015     Priority: Medium     Tobacco abuse      Priority: Medium     Obesity      Priority: Medium        Past Medical History:    Past Medical History:   Diagnosis Date     Obesity      Tobacco abuse        Past Surgical History:    Past Surgical History:   Procedure Laterality Date     ENT SURGERY      tonsils and adnoids     wisdom teeth[         Family History:    Family History   Problem Relation Age of Onset     Diabetes Mother        Social History:  Marital Status:  Single [1]  Social History     Tobacco Use     Smoking status: Current Every Day Smoker     Packs/day: 0.40     Smokeless tobacco: Never Used   Substance Use Topics     Alcohol use: No     Drug use: No        Medications:      amoxicillin-clavulanate (AUGMENTIN) 875-125 MG tablet   enoxaparin (LOVENOX) 30 MG/0.3ML syringe   escitalopram (LEXAPRO) 20 MG tablet   oxyCODONE (ROXICODONE) 5 MG tablet   Prenatal Vit-Fe Fumarate-FA (PRENATAL VITAMIN PO)         Review of  Systems   Constitutional: Positive for chills and fever. Negative for activity change, appetite change, diaphoresis and fatigue.   HENT: Negative.    Eyes: Negative.    Respiratory: Negative.    Cardiovascular: Negative.    Gastrointestinal: Positive for abdominal pain. Negative for nausea and vomiting.   Genitourinary: Negative.    Skin: Negative.    Neurological: Negative.    Hematological: Bruises/bleeds easily.   Psychiatric/Behavioral: Negative.        Physical Exam   BP: 127/76  Pulse: 117  Heart Rate: 98  Temp: (!) 101.9  F (38.8  C)  Resp: 22  SpO2: 99 %      Physical Exam   Constitutional: She is oriented to person, place, and time. She appears well-developed and well-nourished.   HENT:   Head: Normocephalic and atraumatic.   Eyes: Conjunctivae and EOM are normal.   Neck: Normal range of motion. Neck supple.   Cardiovascular: Normal rate and regular rhythm.   Pulmonary/Chest: Effort normal and breath sounds normal.   Abdominal: Soft. She exhibits no distension. There is no tenderness.   Mild lower abdominal tenderness.  The surgical wound looks good there is some slight clear drainage from the wound.  The wound has not dehisced.  There is no induration or evidence cellulitis.   Musculoskeletal: She exhibits no edema.   Neurological: She is alert and oriented to person, place, and time.   Skin: Skin is warm and dry. Capillary refill takes less than 2 seconds.   Psychiatric: She has a normal mood and affect.   Nursing note and vitals reviewed.      ED Course        Procedures               Critical Care time:  none               Results for orders placed or performed during the hospital encounter of 06/03/19 (from the past 24 hour(s))   CBC with platelets differential   Result Value Ref Range    WBC 13.2 (H) 4.0 - 11.0 10e9/L    RBC Count 3.40 (L) 3.8 - 5.2 10e12/L    Hemoglobin 9.6 (L) 11.7 - 15.7 g/dL    Hematocrit 29.8 (L) 35.0 - 47.0 %    MCV 88 78 - 100 fl    MCH 28.2 26.5 - 33.0 pg    MCHC 32.2 31.5 -  36.5 g/dL    RDW 13.6 10.0 - 15.0 %    Platelet Count 397 150 - 450 10e9/L    Diff Method Automated Method     % Neutrophils 86.4 %    % Lymphocytes 6.8 %    % Monocytes 4.7 %    % Eosinophils 1.2 %    % Basophils 0.2 %    % Immature Granulocytes 0.7 %    Nucleated RBCs 0 0 /100    Absolute Neutrophil 11.4 (H) 1.6 - 8.3 10e9/L    Absolute Lymphocytes 0.9 0.8 - 5.3 10e9/L    Absolute Monocytes 0.6 0.0 - 1.3 10e9/L    Absolute Eosinophils 0.2 0.0 - 0.7 10e9/L    Absolute Basophils 0.0 0.0 - 0.2 10e9/L    Abs Immature Granulocytes 0.1 0 - 0.4 10e9/L    Absolute Nucleated RBC 0.0    Comprehensive metabolic panel   Result Value Ref Range    Sodium 139 133 - 144 mmol/L    Potassium 4.1 3.4 - 5.3 mmol/L    Chloride 106 94 - 109 mmol/L    Carbon Dioxide 25 20 - 32 mmol/L    Anion Gap 8 3 - 14 mmol/L    Glucose 90 70 - 99 mg/dL    Urea Nitrogen 14 7 - 30 mg/dL    Creatinine 0.74 0.52 - 1.04 mg/dL    GFR Estimate >90 >60 mL/min/[1.73_m2]    GFR Estimate If Black >90 >60 mL/min/[1.73_m2]    Calcium 8.3 (L) 8.5 - 10.1 mg/dL    Bilirubin Total 0.3 0.2 - 1.3 mg/dL    Albumin 2.6 (L) 3.4 - 5.0 g/dL    Protein Total 6.8 6.8 - 8.8 g/dL    Alkaline Phosphatase 177 (H) 40 - 150 U/L    ALT 27 0 - 50 U/L    AST 14 0 - 45 U/L   Lactic acid whole blood   Result Value Ref Range    Lactic Acid 0.8 0.7 - 2.0 mmol/L   Procalcitonin   Result Value Ref Range    Procalcitonin <0.05 ng/ml   UA reflex to Microscopic   Result Value Ref Range    Color Urine Light Yellow     Appearance Urine Clear     Glucose Urine Negative NEG^Negative mg/dL    Bilirubin Urine Negative NEG^Negative    Ketones Urine Negative NEG^Negative mg/dL    Specific Gravity Urine 1.014 1.003 - 1.035    Blood Urine Small (A) NEG^Negative    pH Urine 7.0 4.7 - 8.0 pH    Protein Albumin Urine Negative NEG^Negative mg/dL    Urobilinogen mg/dL Normal 0.0 - 2.0 mg/dL    Nitrite Urine Negative NEG^Negative    Leukocyte Esterase Urine Moderate (A) NEG^Negative    Source Midstream Urine      RBC Urine 12 (H) 0 - 2 /HPF    WBC Urine 6 (H) 0 - 5 /HPF    Bacteria Urine None (A) NEG^Negative /HPF    Mucous Urine Present (A) NEG^Negative /LPF   CT Abdomen Pelvis w Contrast    Narrative    EXAMINATION: CT ABDOMEN PELVIS W CONTRAST, 6/3/2019 12:09 PM    TECHNIQUE:  Helical CT images from the lung bases through the  symphysis pubis were obtained  with IV contrast. Contrast dose: ISOVUE  300  100ML    COMPARISON: none    HISTORY: fever, lower abdominal pain, c section on 5/22    FINDINGS:    There is dependent atelectasis at the lung bases.    The liver is free of masses or biliary ductal enlargement. The  gallbladder has been removed    The the spleen and pancreas appear normal.    The adrenal glands are normal.    The right and left kidneys are free of masses or hydronephrosis.    The periaortic lymph nodes are normal in caliber.    No intraperitoneal masses or inflammatory changes are noted.    In the pelvis the bladder and rectum appear normal. The uterus is  enlarged due to was recently gravid state.    The regional skeleton is intact postoperative changes are seen in the  anterior abdominal wall. There is some subcutaneous nodules possibly  injection sites seen in the anterior abdominal wall fat.      Impression    IMPRESSION: No intra-abdominal masses or inflammatory changes are seen      ALISIA WALDROP MD       Medications   0.9% sodium chloride BOLUS (0 mLs Intravenous Stopped 6/3/19 1140)     Followed by   sodium chloride 0.9% infusion (0 mLs Intravenous Stopped 6/3/19 1307)   ampicillin-sulbactam (UNASYN) 3 g in sodium chloride 0.9 % 100 mL intermittent infusion (0 g Intravenous Stopped 6/3/19 1255)   ketorolac (TORADOL) injection 30 mg (30 mg Intravenous Given 6/3/19 1149)   sodium chloride (PF) 0.9% PF flush 60 mL (60 mLs Intravenous Given 6/3/19 1159)   iopamidol (ISOVUE-300) IV solution 61% 100 mL (100 mLs Intravenous Given 6/3/19 1159)       Assessments & Plan (with Medical Decision Making)    Work-up as above.  Discussed concerns with the patient.  She looks well.  No current fever.  No tachycardia.  Mild elevated white blood cell count which is obviously nonspecific and a postpartum state.  Lactic acid and procalcitonin are   Normal.  I believe it is certainly reasonable to treat her with IV antibiotics as well as oral Augmentin for the wound discharge as well as ability of endometritis.  Case discussed with OB/GYN on-call Dr. Henderson.  Agreed with the treatment plan.  She will follow-up with her primary care provider or OB/GYN tomorrow for recheck.  I stressed that she return here for any worsening symptoms, new symptoms, or other concerns.    This document was prepared using a combination of typing and voice generated software.  While every attempt was made for accuracy, spelling and grammatical errors may exist.    I have reviewed the nursing notes.    I have reviewed the findings, diagnosis, plan and need for follow up with the patient.          Medication List      Started    amoxicillin-clavulanate 875-125 MG tablet  Commonly known as:  AUGMENTIN  1 tablet, Oral, 2 TIMES DAILY            Final diagnoses:   Postoperative state   Postpartum fever       6/3/2019   [facility]     Lidia Ratliff PA-C  06/03/19 7107

## 2019-06-03 NOTE — ED NOTES
C section wound area lower abd cleaned with sterile gauze, sterile telfa applied to wound area, covered with sterile gauze, secured with paper tape.  Patient instructed on wound care, verbalized an understanding.  Tolerated procedure well.

## 2019-06-04 PROBLEM — N71.9 ENDOMETRITIS: Status: ACTIVE | Noted: 2019-06-04

## 2019-06-04 LAB
ABO + RH BLD: NORMAL
ABO + RH BLD: NORMAL
ALBUMIN SERPL-MCNC: 2.5 G/DL (ref 3.4–5)
ALP SERPL-CCNC: 179 U/L (ref 40–150)
ALT SERPL W P-5'-P-CCNC: 27 U/L (ref 0–50)
ANION GAP SERPL CALCULATED.3IONS-SCNC: 8 MMOL/L (ref 3–14)
AST SERPL W P-5'-P-CCNC: 16 U/L (ref 0–45)
BACTERIA SPEC CULT: NORMAL
BILIRUB SERPL-MCNC: 0.3 MG/DL (ref 0.2–1.3)
BLD GP AB SCN SERPL QL: NORMAL
BLOOD BANK CMNT PATIENT-IMP: NORMAL
BUN SERPL-MCNC: 13 MG/DL (ref 7–30)
CALCIUM SERPL-MCNC: 8.2 MG/DL (ref 8.5–10.1)
CHLORIDE SERPL-SCNC: 110 MMOL/L (ref 94–109)
CO2 SERPL-SCNC: 23 MMOL/L (ref 20–32)
CREAT SERPL-MCNC: 0.78 MG/DL (ref 0.52–1.04)
CREAT SERPL-MCNC: 0.84 MG/DL (ref 0.52–1.04)
GFR SERPL CREATININE-BSD FRML MDRD: >90 ML/MIN/{1.73_M2}
GFR SERPL CREATININE-BSD FRML MDRD: >90 ML/MIN/{1.73_M2}
GLUCOSE SERPL-MCNC: 86 MG/DL (ref 70–99)
HGB BLD-MCNC: 8.2 G/DL (ref 11.7–15.7)
HGB BLD-MCNC: 8.3 G/DL (ref 11.7–15.7)
LACTATE BLD-SCNC: 0.8 MMOL/L (ref 0.7–2)
PLATELET # BLD AUTO: 365 10E9/L (ref 150–450)
POTASSIUM SERPL-SCNC: 4.1 MMOL/L (ref 3.4–5.3)
PROT SERPL-MCNC: 6.7 G/DL (ref 6.8–8.8)
SODIUM SERPL-SCNC: 141 MMOL/L (ref 133–144)
SPECIMEN EXP DATE BLD: NORMAL
SPECIMEN SOURCE: NORMAL

## 2019-06-04 PROCEDURE — 25000128 H RX IP 250 OP 636: Performed by: OBSTETRICS & GYNECOLOGY

## 2019-06-04 PROCEDURE — 99222 1ST HOSP IP/OBS MODERATE 55: CPT | Performed by: OBSTETRICS & GYNECOLOGY

## 2019-06-04 PROCEDURE — 25000132 ZZH RX MED GY IP 250 OP 250 PS 637: Performed by: OBSTETRICS & GYNECOLOGY

## 2019-06-04 PROCEDURE — 25000128 H RX IP 250 OP 636

## 2019-06-04 PROCEDURE — 25800030 ZZH RX IP 258 OP 636: Performed by: FAMILY MEDICINE

## 2019-06-04 PROCEDURE — 25000128 H RX IP 250 OP 636: Performed by: FAMILY MEDICINE

## 2019-06-04 PROCEDURE — 12000000 ZZH R&B MED SURG/OB

## 2019-06-04 PROCEDURE — 96365 THER/PROPH/DIAG IV INF INIT: CPT

## 2019-06-04 PROCEDURE — 85018 HEMOGLOBIN: CPT | Performed by: OBSTETRICS & GYNECOLOGY

## 2019-06-04 PROCEDURE — 82565 ASSAY OF CREATININE: CPT | Performed by: OBSTETRICS & GYNECOLOGY

## 2019-06-04 PROCEDURE — 85049 AUTOMATED PLATELET COUNT: CPT | Performed by: OBSTETRICS & GYNECOLOGY

## 2019-06-04 PROCEDURE — 36415 COLL VENOUS BLD VENIPUNCTURE: CPT | Performed by: OBSTETRICS & GYNECOLOGY

## 2019-06-04 PROCEDURE — 96375 TX/PRO/DX INJ NEW DRUG ADDON: CPT

## 2019-06-04 RX ORDER — SODIUM CHLORIDE 9 MG/ML
INJECTION, SOLUTION INTRAVENOUS
Status: DISCONTINUED
Start: 2019-06-04 | End: 2019-06-04 | Stop reason: HOSPADM

## 2019-06-04 RX ORDER — OXYCODONE AND ACETAMINOPHEN 5; 325 MG/1; MG/1
1-2 TABLET ORAL EVERY 4 HOURS PRN
Status: DISCONTINUED | OUTPATIENT
Start: 2019-06-04 | End: 2019-06-06 | Stop reason: HOSPADM

## 2019-06-04 RX ORDER — NALOXONE HYDROCHLORIDE 0.4 MG/ML
.1-.4 INJECTION, SOLUTION INTRAMUSCULAR; INTRAVENOUS; SUBCUTANEOUS
Status: DISCONTINUED | OUTPATIENT
Start: 2019-06-04 | End: 2019-06-06 | Stop reason: HOSPADM

## 2019-06-04 RX ORDER — AMPICILLIN 2 G/1
INJECTION, POWDER, FOR SOLUTION INTRAVENOUS
Status: DISCONTINUED
Start: 2019-06-04 | End: 2019-06-04

## 2019-06-04 RX ORDER — AMPICILLIN AND SULBACTAM 2; 1 G/1; G/1
3 INJECTION, POWDER, FOR SOLUTION INTRAMUSCULAR; INTRAVENOUS ONCE
Status: DISCONTINUED | OUTPATIENT
Start: 2019-06-04 | End: 2019-06-04

## 2019-06-04 RX ORDER — MORPHINE SULFATE 2 MG/ML
2-4 INJECTION, SOLUTION INTRAMUSCULAR; INTRAVENOUS
Status: DISCONTINUED | OUTPATIENT
Start: 2019-06-04 | End: 2019-06-06 | Stop reason: HOSPADM

## 2019-06-04 RX ORDER — AMPICILLIN 1 G/1
INJECTION, POWDER, FOR SOLUTION INTRAMUSCULAR; INTRAVENOUS
Status: DISCONTINUED
Start: 2019-06-04 | End: 2019-06-04

## 2019-06-04 RX ORDER — ONDANSETRON 2 MG/ML
4 INJECTION INTRAMUSCULAR; INTRAVENOUS EVERY 30 MIN PRN
Status: DISCONTINUED | OUTPATIENT
Start: 2019-06-04 | End: 2019-06-04

## 2019-06-04 RX ORDER — MORPHINE SULFATE 2 MG/ML
INJECTION, SOLUTION INTRAMUSCULAR; INTRAVENOUS
Status: COMPLETED
Start: 2019-06-04 | End: 2019-06-04

## 2019-06-04 RX ORDER — ONDANSETRON 4 MG/1
4 TABLET, ORALLY DISINTEGRATING ORAL EVERY 6 HOURS PRN
Status: DISCONTINUED | OUTPATIENT
Start: 2019-06-04 | End: 2019-06-06 | Stop reason: HOSPADM

## 2019-06-04 RX ORDER — DOCUSATE SODIUM 100 MG/1
100 CAPSULE, LIQUID FILLED ORAL 2 TIMES DAILY
Status: DISCONTINUED | OUTPATIENT
Start: 2019-06-04 | End: 2019-06-06 | Stop reason: HOSPADM

## 2019-06-04 RX ORDER — ONDANSETRON 2 MG/ML
INJECTION INTRAMUSCULAR; INTRAVENOUS
Status: COMPLETED
Start: 2019-06-04 | End: 2019-06-04

## 2019-06-04 RX ORDER — IBUPROFEN 600 MG/1
600 TABLET, FILM COATED ORAL EVERY 6 HOURS PRN
Status: DISCONTINUED | OUTPATIENT
Start: 2019-06-04 | End: 2019-06-06 | Stop reason: HOSPADM

## 2019-06-04 RX ORDER — ONDANSETRON 2 MG/ML
4 INJECTION INTRAMUSCULAR; INTRAVENOUS EVERY 6 HOURS PRN
Status: DISCONTINUED | OUTPATIENT
Start: 2019-06-04 | End: 2019-06-06 | Stop reason: HOSPADM

## 2019-06-04 RX ADMIN — TAZOBACTAM SODIUM AND PIPERACILLIN SODIUM 3.38 G: 375; 3 INJECTION, SOLUTION INTRAVENOUS at 15:24

## 2019-06-04 RX ADMIN — SODIUM CHLORIDE 1000 ML: 9 INJECTION, SOLUTION INTRAVENOUS at 14:46

## 2019-06-04 RX ADMIN — ENOXAPARIN SODIUM 40 MG: 40 INJECTION SUBCUTANEOUS at 16:51

## 2019-06-04 RX ADMIN — DOCUSATE SODIUM 100 MG: 100 CAPSULE, LIQUID FILLED ORAL at 08:15

## 2019-06-04 RX ADMIN — OXYCODONE HYDROCHLORIDE AND ACETAMINOPHEN 2 TABLET: 5; 325 TABLET ORAL at 23:40

## 2019-06-04 RX ADMIN — IBUPROFEN 600 MG: 600 TABLET ORAL at 10:31

## 2019-06-04 RX ADMIN — TAZOBACTAM SODIUM AND PIPERACILLIN SODIUM 3.38 G: 375; 3 INJECTION, SOLUTION INTRAVENOUS at 20:37

## 2019-06-04 RX ADMIN — SODIUM CHLORIDE 1000 ML: 9 INJECTION, SOLUTION INTRAVENOUS at 01:10

## 2019-06-04 RX ADMIN — TAZOBACTAM SODIUM AND PIPERACILLIN SODIUM 3.38 G: 375; 3 INJECTION, SOLUTION INTRAVENOUS at 02:24

## 2019-06-04 RX ADMIN — DOCUSATE SODIUM 100 MG: 100 CAPSULE, LIQUID FILLED ORAL at 20:37

## 2019-06-04 RX ADMIN — OXYCODONE HYDROCHLORIDE AND ACETAMINOPHEN 2 TABLET: 5; 325 TABLET ORAL at 19:20

## 2019-06-04 RX ADMIN — ONDANSETRON 4 MG: 2 INJECTION INTRAMUSCULAR; INTRAVENOUS at 01:05

## 2019-06-04 RX ADMIN — MORPHINE SULFATE 4 MG: 2 INJECTION, SOLUTION INTRAMUSCULAR; INTRAVENOUS at 03:47

## 2019-06-04 RX ADMIN — HYDROMORPHONE HYDROCHLORIDE 1 MG: 1 INJECTION, SOLUTION INTRAMUSCULAR; INTRAVENOUS; SUBCUTANEOUS at 01:05

## 2019-06-04 RX ADMIN — OXYCODONE HYDROCHLORIDE AND ACETAMINOPHEN 2 TABLET: 5; 325 TABLET ORAL at 07:16

## 2019-06-04 RX ADMIN — OXYCODONE HYDROCHLORIDE AND ACETAMINOPHEN 2 TABLET: 5; 325 TABLET ORAL at 12:26

## 2019-06-04 RX ADMIN — MORPHINE SULFATE 4 MG: 2 INJECTION, SOLUTION INTRAMUSCULAR; INTRAVENOUS at 07:13

## 2019-06-04 RX ADMIN — IBUPROFEN 600 MG: 600 TABLET ORAL at 19:20

## 2019-06-04 RX ADMIN — TAZOBACTAM SODIUM AND PIPERACILLIN SODIUM 3.38 G: 375; 3 INJECTION, SOLUTION INTRAVENOUS at 09:06

## 2019-06-04 RX ADMIN — MORPHINE SULFATE 4 MG: 2 INJECTION, SOLUTION INTRAMUSCULAR; INTRAVENOUS at 14:48

## 2019-06-04 RX ADMIN — SODIUM CHLORIDE 1000 ML: 9 INJECTION, SOLUTION INTRAVENOUS at 23:40

## 2019-06-04 ASSESSMENT — ENCOUNTER SYMPTOMS
SHORTNESS OF BREATH: 0
CONFUSION: 0
DIFFICULTY URINATING: 0
APPETITE CHANGE: 0
NECK STIFFNESS: 0
COLOR CHANGE: 0
EYE REDNESS: 0
HEADACHES: 0
ARTHRALGIAS: 0

## 2019-06-04 ASSESSMENT — MIFFLIN-ST. JEOR: SCORE: 2481.81

## 2019-06-04 NOTE — ED PROVIDER NOTES
History     Chief Complaint   Patient presents with     Vaginal Bleeding     HPI  Johanny Infante is a 24 year old G2, now  s/p term C/S on 5/24/19, seen earlier today and diagnosed with endometritis in consultation with the on-call obstetrician, Dr. Henderson. She is anticoagulated with enoxaparin for history of antiphospholipid antibody syndrome. The patient returns by EMS reporting increasing, intractable pain as well as golf ball-size clots. She reports fevers/chills and feels weak and dizzy.    Allergies:  No Known Allergies    Problem List:    Patient Active Problem List    Diagnosis Date Noted     Encounter for triage in pregnant patient 10/02/2017     Priority: Medium     Uncontrolled pain 05/19/2015     Priority: Medium     Tobacco abuse      Priority: Medium     Obesity      Priority: Medium        Past Medical History:    Past Medical History:   Diagnosis Date     Obesity      Tobacco abuse        Past Surgical History:    Past Surgical History:   Procedure Laterality Date     ENT SURGERY      tonsils and adnoids     wisdom teeth[         Family History:    Family History   Problem Relation Age of Onset     Diabetes Mother        Social History:  Marital Status:  Single [1]  Social History     Tobacco Use     Smoking status: Current Every Day Smoker     Packs/day: 0.40     Smokeless tobacco: Never Used   Substance Use Topics     Alcohol use: No     Drug use: No        Medications:      amoxicillin-clavulanate (AUGMENTIN) 875-125 MG tablet   enoxaparin (LOVENOX) 30 MG/0.3ML syringe   escitalopram (LEXAPRO) 20 MG tablet   oxyCODONE (ROXICODONE) 5 MG tablet   Prenatal Vit-Fe Fumarate-FA (PRENATAL VITAMIN PO)         Review of Systems   Constitutional: Negative for appetite change.   HENT: Negative for congestion.    Eyes: Negative for redness.   Respiratory: Negative for shortness of breath.    Cardiovascular: Negative for chest pain.   Genitourinary: Negative for difficulty urinating.   Musculoskeletal:  Negative for arthralgias and neck stiffness.   Skin: Negative for color change.   Neurological: Negative for headaches.   Psychiatric/Behavioral: Negative for confusion.       Physical Exam   BP: 139/71  Pulse: 101  Temp: 99.6  F (37.6  C)  Resp: 20  SpO2: 97 %      Physical Exam    General Appearance: well-developed, well-nourished, alert & oriented, anxious and crying.    HEENT: atraumatic. Pupils equal, round & reactive to light, extraocular movements intact. Bilateral ear canals clear. Nose without rhinorrhea or epistaxis. Clear oropharynx. Moist mucous membranes.    Neck: normal inspection, non-tender, full & painless ROM, supple, no lymphadenopathy or nuchal rigidity. No jugular venous distension.    Cardiovascular: regular rate, rhythm, normal S1 & S2, no murmurs, rubs or gallops.    Respiratory: clear lung sounds with good air entry, no wheezes rales or rhonchi, no acute respiratory distress.    Gastrointestinal: obese abdomen with minimal drainage from healing surgical incision, acutely tender with palpation of moderately firm fundus, no masses or organomegaly.    Genitourinary: speculum available unable to reach to cervix given patient's mass. Blood draining steadily from vagina. No clots.    Extremities: normal inspection, 2+/4+ pulses bilaterally, normal & painless ROM, non-tender, joints normal.    Neurologic: CN II - XII intact, no motor/sensory deficit.    Skin: normal color, no skin rash.    ED Course        Procedures                 Results for orders placed or performed during the hospital encounter of 06/03/19 (from the past 24 hour(s))   CBC with platelets differential   Result Value Ref Range    WBC 13.4 (H) 4.0 - 11.0 10e9/L    RBC Count 3.26 (L) 3.8 - 5.2 10e12/L    Hemoglobin 9.2 (L) 11.7 - 15.7 g/dL    Hematocrit 28.3 (L) 35.0 - 47.0 %    MCV 87 78 - 100 fl    MCH 28.2 26.5 - 33.0 pg    MCHC 32.5 31.5 - 36.5 g/dL    RDW 13.5 10.0 - 15.0 %    Platelet Count 429 150 - 450 10e9/L    Diff  Method Automated Method     % Neutrophils 78.7 %    % Lymphocytes 11.5 %    % Monocytes 6.6 %    % Eosinophils 2.4 %    % Basophils 0.1 %    % Immature Granulocytes 0.7 %    Nucleated RBCs 0 0 /100    Absolute Neutrophil 10.6 (H) 1.6 - 8.3 10e9/L    Absolute Lymphocytes 1.5 0.8 - 5.3 10e9/L    Absolute Monocytes 0.9 0.0 - 1.3 10e9/L    Absolute Eosinophils 0.3 0.0 - 0.7 10e9/L    Absolute Basophils 0.0 0.0 - 0.2 10e9/L    Abs Immature Granulocytes 0.1 0 - 0.4 10e9/L    Absolute Nucleated RBC 0.0    Comprehensive metabolic panel   Result Value Ref Range    Sodium 141 133 - 144 mmol/L    Potassium 4.1 3.4 - 5.3 mmol/L    Chloride 110 (H) 94 - 109 mmol/L    Carbon Dioxide 23 20 - 32 mmol/L    Anion Gap 8 3 - 14 mmol/L    Glucose 86 70 - 99 mg/dL    Urea Nitrogen 13 7 - 30 mg/dL    Creatinine 0.84 0.52 - 1.04 mg/dL    GFR Estimate >90 >60 mL/min/[1.73_m2]    GFR Estimate If Black >90 >60 mL/min/[1.73_m2]    Calcium 8.2 (L) 8.5 - 10.1 mg/dL    Bilirubin Total 0.3 0.2 - 1.3 mg/dL    Albumin 2.5 (L) 3.4 - 5.0 g/dL    Protein Total 6.7 (L) 6.8 - 8.8 g/dL    Alkaline Phosphatase 179 (H) 40 - 150 U/L    ALT 27 0 - 50 U/L    AST 16 0 - 45 U/L   Lactate for Sepsis Protocol   Result Value Ref Range    Lactate for Sepsis Protocol 0.8 0.7 - 2.0 mmol/L       Medications   0.9% sodium chloride BOLUS (0 mLs Intravenous Stopped 19)     Followed by   sodium chloride 0.9% infusion (1,000 mLs Intravenous New Bag 19)   ondansetron (ZOFRAN) injection 4 mg (4 mg Intravenous Given 19)   HYDROmorphone (DILAUDID) injection 1 mg (1 mg Intravenous Given 19)       Assessments & Plan (with Medical Decision Making)   The patient is a 24  with endometritis with vaginal bleeding.    1) Endometritis - patient discussed with Dr. Henderson who accepts the patient for admission. Ampicillin - sulbactam will be started in the ER.      I have reviewed the nursing notes.    I have reviewed the findings, diagnosis, plan  and need for follow up with the patient.       Medication List      There are no discharge medications for this visit.         Final diagnoses:   Endometritis   Vaginal bleeding   Intractable pain       6/3/2019   [facility]     Jm Altman MD  06/04/19 0125

## 2019-06-04 NOTE — PLAN OF CARE
Pt has reported pain at tolerable level after treated with pain med, vss, afebrile. Last check of nav pad was only scant amount of blood. Pt has had visitors all day, states she will shower this evening. Ate meals with no nausea. Has been pumping milk for baby at home. Incision to abd remains with scant yellow drainage, no redness or dehiscence noted. Has scd's on bilat le's encouraged pt to be up and ambulate today, she has remained in bed visiting with company most of day and only up and down to the bathroom.

## 2019-06-04 NOTE — PROGRESS NOTES
Arrived from ED @ 0335. Pt reports 10/10 pain upon assess and cannot move d/t pain. Provider notified and verbal orders per MAR Rx. Admin per MAR and pain to 6/10. Golf-sized clot vaginal discharge and scant purulent discharge from incision site. Letdown and breast pump provided for discomfort. Additional pain from movement during rest of assessment and oral pain med admin per mar. Pt reports pain at acceptable level and resting comfortably as of 0442.

## 2019-06-04 NOTE — PLAN OF CARE
Pt resting in bed, has pneumo boots on, states pain is tolerable at 5/10 after having pain meds earlier. Has been seen by provider and is aware of plan of care. Call light in reach, assess as charted.

## 2019-06-04 NOTE — ED NOTES
Patient with increase pain with pelvic exam;  Blood noted to be running out of patient during exam.  MD unable to complete exam.  Mom at bedside and updated.

## 2019-06-04 NOTE — ED NOTES
Patient here for vaginal bleeding that she states has increased since she was here earlier.  States since 1900, she has used 4 pads. States just before the ambulance arrived, she passed a large blood clot and then the vaginal bleeding increased.  EMS states that they saw a few small clots while on scene. Patient states that her  incision or pain at site has not changed.

## 2019-06-04 NOTE — ED NOTES
"Patient was able to stand up with assist and move down the cot.  Patient states she is having 10/10 pain \"inside\" patient states she hasn't taken anything for pain today as she has been \"here\" all day.  Patient with moderate amount of bleeding in pads with some 1/2 dollar size clots noted.  Patient prepped for pelvic exam.  "

## 2019-06-04 NOTE — H&P
OB/GYN H&P    POD # 12 s/p  section in [place]. Pt seen in the ED yesterday twice for abdominal pain and fever. She was initially given Unasyn x 1 and started on Augmentin but upon her return she was admitted for IV abx.    ROS:  Constitutional, neuro, endocrine, gastrointestinal, genitourinary and psychiatric systems are otherwise negative.       Past Medical History:   Diagnosis Date     Obesity      Tobacco abuse      Past Surgical History:   Procedure Laterality Date     ENT SURGERY      tonsils and adnoids     wisdom teeth[        No Known Allergies    EXAM  /57   Pulse 88   Temp 98.9  F (37.2  C) (Oral)   Resp 20   SpO2 97%   Gen - NAD  Lungs - CTA  CV - RRR  Abdomen - soft, non-tender  Incision - clean, dry, intact  Extremities - no CT, No edema      A/P Post-op endometritis   Pt has been started on Unasyn so will continue this    Will track fever and WBC   Blood cultures pending   Afebrile since admit   Continue Lovenox prophylaxis 2nd to antiphospholipid syn.    Imaging did not indicate and retained POC's    ION CHAN MD

## 2019-06-04 NOTE — PHARMACY
[facility]    Pharmacy      Antimicrobial Stewardship Note     Current antimicrobial therapy:  Anti-infectives (From now, onward)    Start     Dose/Rate Route Frequency Ordered Stop    06/04/19 1500  piperacillin-tazobactam (ZOSYN) infusion 3.375 g      3.375 g  100 mL/hr over 30 Minutes Intravenous EVERY 6 HOURS 06/04/19 1225          Indication: possible post-op infection    Days of Therapy: 1     Pertinent labs:  Creatinine   Creatinine   Date Value Ref Range Status   06/04/2019 0.78 0.52 - 1.04 mg/dL Final   06/03/2019 0.84 0.52 - 1.04 mg/dL Final   06/03/2019 0.74 0.52 - 1.04 mg/dL Final     WBC   WBC   Date Value Ref Range Status   06/03/2019 13.4 (H) 4.0 - 11.0 10e9/L Final   06/03/2019 13.2 (H) 4.0 - 11.0 10e9/L Final   05/20/2015 10.3 4.0 - 11.0 10e9/L Final     Procalcitonin   Procalcitonin   Date Value Ref Range Status   06/03/2019 <0.05 ng/ml Final     Comment:     <0.05 ng/ml  Normal  Recommendation: Very low risk of bacterial infection.   Discourage antibiotics unless strong clinical suspicion for serious infection.       CRP No results found for: CRP    Culture Results:   Procedure Component Value Units Date/Time   Urine Culture [[id number]] Collected: 06/03/19 1037   Order Status: Completed Lab Status: Final result Updated: 06/04/19 0833   Specimen: Midstream Urine     Specimen Description Midstream Urine    Culture Micro 10,000 to 50,000 colonies/mL   mixed urogenital jaylen   No further identification or sensitivity done    Blood culture [[id number]] Collected: 06/03/19 1030   Order Status: Completed Lab Status: Preliminary result Updated: 06/04/19 0610   Specimen: Blood     Specimen Description Blood Left Arm    Culture Micro No growth after 19 hours   Blood culture [[id number]] Collected: 06/03/19 1020   Order Status: Completed Lab Status: Preliminary result Updated: 06/04/19 0610   Specimen: Blood     Specimen Description Blood    Culture Micro No growth after 19 hours   Blood culture    Order  Status: Canceled Lab Status: No result    Specimen: Blood        Recommendations/Interventions:  1. No recommendations at this time. Will continue to monitor.     Magdalena Wang RPH  June 4, 2019

## 2019-06-05 LAB
BASOPHILS # BLD AUTO: 0 10E9/L (ref 0–0.2)
BASOPHILS NFR BLD AUTO: 0.4 %
DIFFERENTIAL METHOD BLD: ABNORMAL
EOSINOPHIL # BLD AUTO: 0.4 10E9/L (ref 0–0.7)
EOSINOPHIL NFR BLD AUTO: 5.5 %
ERYTHROCYTE [DISTWIDTH] IN BLOOD BY AUTOMATED COUNT: 13.7 % (ref 10–15)
HCT VFR BLD AUTO: 25.3 % (ref 35–47)
HGB BLD-MCNC: 7.9 G/DL (ref 11.7–15.7)
IMM GRANULOCYTES # BLD: 0.1 10E9/L (ref 0–0.4)
IMM GRANULOCYTES NFR BLD: 0.8 %
LYMPHOCYTES # BLD AUTO: 1.8 10E9/L (ref 0.8–5.3)
LYMPHOCYTES NFR BLD AUTO: 24.1 %
MCH RBC QN AUTO: 28.1 PG (ref 26.5–33)
MCHC RBC AUTO-ENTMCNC: 31.2 G/DL (ref 31.5–36.5)
MCV RBC AUTO: 90 FL (ref 78–100)
MONOCYTES # BLD AUTO: 0.6 10E9/L (ref 0–1.3)
MONOCYTES NFR BLD AUTO: 7.2 %
NEUTROPHILS # BLD AUTO: 4.7 10E9/L (ref 1.6–8.3)
NEUTROPHILS NFR BLD AUTO: 62 %
NRBC # BLD AUTO: 0 10*3/UL
NRBC BLD AUTO-RTO: 0 /100
PLATELET # BLD AUTO: 367 10E9/L (ref 150–450)
RBC # BLD AUTO: 2.81 10E12/L (ref 3.8–5.2)
WBC # BLD AUTO: 7.6 10E9/L (ref 4–11)

## 2019-06-05 PROCEDURE — 25800030 ZZH RX IP 258 OP 636: Performed by: FAMILY MEDICINE

## 2019-06-05 PROCEDURE — 99232 SBSQ HOSP IP/OBS MODERATE 35: CPT | Performed by: OBSTETRICS & GYNECOLOGY

## 2019-06-05 PROCEDURE — 25800030 ZZH RX IP 258 OP 636: Performed by: OBSTETRICS & GYNECOLOGY

## 2019-06-05 PROCEDURE — 36415 COLL VENOUS BLD VENIPUNCTURE: CPT | Performed by: OBSTETRICS & GYNECOLOGY

## 2019-06-05 PROCEDURE — 25000132 ZZH RX MED GY IP 250 OP 250 PS 637: Performed by: OBSTETRICS & GYNECOLOGY

## 2019-06-05 PROCEDURE — 25000128 H RX IP 250 OP 636: Performed by: OBSTETRICS & GYNECOLOGY

## 2019-06-05 PROCEDURE — 85025 COMPLETE CBC W/AUTO DIFF WBC: CPT | Performed by: OBSTETRICS & GYNECOLOGY

## 2019-06-05 PROCEDURE — 12000000 ZZH R&B MED SURG/OB

## 2019-06-05 RX ORDER — EPINEPHRINE 1 MG/ML
0.3 INJECTION, SOLUTION INTRAMUSCULAR; SUBCUTANEOUS
Status: DISCONTINUED | OUTPATIENT
Start: 2019-06-05 | End: 2019-06-06 | Stop reason: HOSPADM

## 2019-06-05 RX ORDER — DIPHENHYDRAMINE HYDROCHLORIDE 50 MG/ML
50 INJECTION INTRAMUSCULAR; INTRAVENOUS
Status: DISCONTINUED | OUTPATIENT
Start: 2019-06-05 | End: 2019-06-06 | Stop reason: HOSPADM

## 2019-06-05 RX ORDER — METHYLPREDNISOLONE SODIUM SUCCINATE 125 MG/2ML
125 INJECTION, POWDER, LYOPHILIZED, FOR SOLUTION INTRAMUSCULAR; INTRAVENOUS
Status: DISCONTINUED | OUTPATIENT
Start: 2019-06-05 | End: 2019-06-06 | Stop reason: HOSPADM

## 2019-06-05 RX ADMIN — SODIUM CHLORIDE 1000 ML: 9 INJECTION, SOLUTION INTRAVENOUS at 08:10

## 2019-06-05 RX ADMIN — SODIUM CHLORIDE 1000 ML: 9 INJECTION, SOLUTION INTRAVENOUS at 18:23

## 2019-06-05 RX ADMIN — TAZOBACTAM SODIUM AND PIPERACILLIN SODIUM 3.38 G: 375; 3 INJECTION, SOLUTION INTRAVENOUS at 03:26

## 2019-06-05 RX ADMIN — ENOXAPARIN SODIUM 40 MG: 40 INJECTION SUBCUTANEOUS at 16:54

## 2019-06-05 RX ADMIN — DOCUSATE SODIUM 100 MG: 100 CAPSULE, LIQUID FILLED ORAL at 21:14

## 2019-06-05 RX ADMIN — IBUPROFEN 600 MG: 600 TABLET ORAL at 13:11

## 2019-06-05 RX ADMIN — OXYCODONE HYDROCHLORIDE AND ACETAMINOPHEN 2 TABLET: 5; 325 TABLET ORAL at 03:25

## 2019-06-05 RX ADMIN — TAZOBACTAM SODIUM AND PIPERACILLIN SODIUM 3.38 G: 375; 3 INJECTION, SOLUTION INTRAVENOUS at 21:14

## 2019-06-05 RX ADMIN — IRON SUCROSE 200 MG: 20 INJECTION, SOLUTION INTRAVENOUS at 09:23

## 2019-06-05 RX ADMIN — TAZOBACTAM SODIUM AND PIPERACILLIN SODIUM 3.38 G: 375; 3 INJECTION, SOLUTION INTRAVENOUS at 15:35

## 2019-06-05 RX ADMIN — TAZOBACTAM SODIUM AND PIPERACILLIN SODIUM 3.38 G: 375; 3 INJECTION, SOLUTION INTRAVENOUS at 08:45

## 2019-06-05 RX ADMIN — DOCUSATE SODIUM 100 MG: 100 CAPSULE, LIQUID FILLED ORAL at 08:45

## 2019-06-05 NOTE — PLAN OF CARE
Pt A&O x4, VSS, afebrile. Bleeding light to scant, no clots noted. IV antibiotics administered per MAR. Ambulation encouraged, pt only OOB to use bathroom with stand-by, voids without issue, frequent voiding encouraged. SCD's in use, increased fluid intake encouraged, IV fluids infusing per MAR. Surgical incision to abdomen without signs of infection, new telfa in place at time of assessment. PO pain medications given PRN with adequate results, pt able to sleep at times and reports pain is OK at rest and increases with activity. Pt encouraged to shower before bed but declined. Pt using double electric breast pump, supplies provided. Pt makes needs known, call light within reach.

## 2019-06-05 NOTE — PROGRESS NOTES
"OB/GYN progress note      Pt states she is feeling better. Vaginal bleeding scant to none    EXAM  /56   Pulse 72   Temp 97.6  F (36.4  C) (Oral)   Resp 16   Ht 1.727 m (5' 8\")   Wt (!) 167.8 kg (370 lb)   SpO2 100%   BMI 56.26 kg/m    Gen - NAD  Abdomen - non-tender, obese  Incision - clean, dry, intact  VE trace blood on pad  Extremities - No CT, no edema, pneumoboots on    A/P POD # 13, s/p  in [place], admitted with endometritis   1. Bleeding now minimal, HGB stable. Start daily Venofer, repeat HGB in am   2. Continue Unasyn    3. Continue Lovenox and pneumoboots, pt must ambulate today   4. WBC has normalized and Tmax was 99.0. Expect to be able to discharge tomorrow    ION CHAN MD      "

## 2019-06-05 NOTE — PLAN OF CARE
Face to face report given with opportunity to observe patient.    Report given to Isis Laurent   6/5/2019  7:08 AM

## 2019-06-05 NOTE — PLAN OF CARE
Pt states she is feeling better today, took a shower this afternoon and was up to ambulate in halls without difficulty. Pain today in abd was 2/10 - 0/10 and pt declined need for any meds for abd pain today. Did take ibuprofen for headache with relief. Had iron infusion this am and tolerated well. Continues with iv antibiotic and fluids. Pt is eating and drinking without difficulty. Scant to no bleeding today. Plan is to discontinue tomorrow to home. Vss, afebrile

## 2019-06-05 NOTE — PROGRESS NOTES
Pt with pain at tolerable level this am, rates 2/10 without pain meds, pt denies need for pain meds. Iron infusion running on pump, vss. Pt's mom and baby here to visit. pneumoboots on pt, pt has been encouraged to get up and out of bed, declines at this time  Will continue to monitor/encourage activity.

## 2019-06-06 VITALS
WEIGHT: 293 LBS | BODY MASS INDEX: 44.41 KG/M2 | HEIGHT: 68 IN | DIASTOLIC BLOOD PRESSURE: 55 MMHG | OXYGEN SATURATION: 97 % | SYSTOLIC BLOOD PRESSURE: 102 MMHG | TEMPERATURE: 98 F | HEART RATE: 72 BPM | RESPIRATION RATE: 16 BRPM

## 2019-06-06 LAB — HGB BLD-MCNC: 7.7 G/DL (ref 11.7–15.7)

## 2019-06-06 PROCEDURE — 99238 HOSP IP/OBS DSCHRG MGMT 30/<: CPT | Performed by: OBSTETRICS & GYNECOLOGY

## 2019-06-06 PROCEDURE — 36415 COLL VENOUS BLD VENIPUNCTURE: CPT | Performed by: OBSTETRICS & GYNECOLOGY

## 2019-06-06 PROCEDURE — 25800030 ZZH RX IP 258 OP 636: Performed by: OBSTETRICS & GYNECOLOGY

## 2019-06-06 PROCEDURE — 25000128 H RX IP 250 OP 636: Performed by: OBSTETRICS & GYNECOLOGY

## 2019-06-06 PROCEDURE — 85018 HEMOGLOBIN: CPT | Performed by: OBSTETRICS & GYNECOLOGY

## 2019-06-06 PROCEDURE — 25000132 ZZH RX MED GY IP 250 OP 250 PS 637: Performed by: OBSTETRICS & GYNECOLOGY

## 2019-06-06 RX ADMIN — DOCUSATE SODIUM 100 MG: 100 CAPSULE, LIQUID FILLED ORAL at 09:49

## 2019-06-06 RX ADMIN — IRON SUCROSE 200 MG: 20 INJECTION, SOLUTION INTRAVENOUS at 09:50

## 2019-06-06 RX ADMIN — TAZOBACTAM SODIUM AND PIPERACILLIN SODIUM 3.38 G: 375; 3 INJECTION, SOLUTION INTRAVENOUS at 09:16

## 2019-06-06 NOTE — DISCHARGE INSTRUCTIONS
Obstetric Endometritis    You have endometritis. This means the lining of the uterus is inflamed. It is usually caused by an infection.   These are common symptoms of endometritis:    Fever    Feeling ill    Pain in the lower belly (abdomen)    Abnormal bleeding from the vagina    Bad-smelling fluid from the vagina    Pain when urinating    Pain during sex    Headache    Fatigue    Loss of appetite  Endometritis can be caused by:    Vaginal delivery or     Long labor    Miscarriage        Tissue from the placenta that stays in the uterus after delivery    Vaginal infection  In some cases, an infection called pelvic inflammatory disease (PID) also happens. This affects the ovaries and fallopian tubes. If not treated, PID may lead to infertility. It can also lead to full-body infection (sepsis).  Home care  You may be given antibiotic medicine to take for the infection. Your symptoms should get better within 2 to 3 days of starting the antibiotics. Take the antibiotics until they are used up. It s important to finish the antibiotics even if you feel better. This is to make sure the infection has cleared up.  General care  You can take acetaminophen or ibuprofen for pain, unless you were given a different pain medicine to use. If you have chronic liver or kidney disease, talk with your healthcare provider before using these medicines. Also talk with your provider if you ve had a stomach ulcer or GI bleeding or are taking blood thinner medicine.    Plan to rest at home for the rest of the day.    Until you have taken all the antibiotics and your symptoms are gone:  ? Don t have sex until your provider says it is OK.  ? Don t put anything into your vagina, including tampons.  ? Don t douche. Douching is generally not recommended at any time.  ? Don t take a tub bath, use a hot tub, or swim. It s fine to shower.  Follow-up care  Follow up with your healthcare provider, or as advised. You may need to  have a procedure to clear tissue out of the uterus. This is called dilation and curettage (D&C). Your provider can tell you more about this.  Call 911  Call 911 if any of these occur:    Severe pain and very heavy bleeding    Severe lightheadedness, passing out, or fainting    Rapid heart rate    Trouble breathing    Confusion or difficulty waking up  When to seek medical advice  Call your healthcare provider right away if any of these occur:    Fever of 100.4 F (38 C) or higher, or as directed by your healthcare provider    Symptoms get worse    You have new symptoms    Nausea or vomiting    Your symptoms don t improve within 3 days of starting treatment  Date Last Reviewed: 10/1/2016    [id number] [employer]. [address] [place] [address], [place] [address]. All rights reserved. This information is not intended as a substitute for professional medical care. Always follow your healthcare professional's instructions.

## 2019-06-06 NOTE — PLAN OF CARE
"/56   Pulse 75   Temp 98.8  F (37.1  C) (Oral)   Resp 14   Ht 1.727 m (5' 8\")   Wt (!) 167.8 kg (370 lb)   SpO2 96%   BMI 56.26 kg/m    Pt A&O x4, denies pain this shift. No clots this shift, bleeding scant. IV antibiotics administered per MAR. Abdominal incision without signs of infection. See flowsheet for assessment data. Pt makes needs known, call light within reach.   "

## 2019-06-06 NOTE — PLAN OF CARE
Patient discharged at 11:35 AM via ambulation accompanied by mother and staff. Prescriptions sent with patient to fill at preferred pharmacy. All belongings sent with patient.     Discharge instructions reviewed with patient. Listed belongings gathered and returned to patient.     Patient discharged to home.   Core Measures and Vaccines  Core Measures applicable during stay: No.   Pneumonia and Influenza given prior to discharge, if indicated: No    Surgical Patient   Surgical Procedures during stay: no  Did patient receive discharge instruction on wound care and recognition of infection symptoms? Yes    MISC  Follow up appointment made:  Yes  Home and hospital aquired medications returned to patient: Yes  Patient reports pain was well managed at discharge: Yes

## 2019-06-06 NOTE — PLAN OF CARE
Face to face report given with opportunity to observe patient.    Report given to Cady Laurent   6/6/2019  7:06 AM

## 2019-06-06 NOTE — PROGRESS NOTES
"GYN Progress note    Pt looks and feels good. Ready for discharge. She has been ambulating      EXAM  /55   Pulse 72   Temp 98  F (36.7  C) (Oral)   Resp 16   Ht 1.727 m (5' 8\")   Wt (!) 167.8 kg (370 lb)   SpO2 97%   BMI 56.26 kg/m    Gen - NAD  Abdomen - soft, Non-tender  Extremities - no CT, No edema    A/P POD # 14, s/p , admitted with endometritis     1. Discharge home   2. Will convert to Augmentin   3. Pt to receive last dose of Venofer and Unasyn this am   4. HGB stable and bleeding resolved   5. Needs to be seen early next week by PCP    ION CHAN MD    "

## 2019-06-06 NOTE — PLAN OF CARE
Assessment complete and as charted. VSS. Denies pain at this time. Dr Velez here to see pt. IV infusing via pump. No redness or inflammation noted at site. Up ad augusto. No needs at this time. Will continue to monitor.

## 2019-06-06 NOTE — DISCHARGE SUMMARY
[facility] Discharge Summary    Johanny Infante MRN# [id number]   Age: 24 year old YOB: 1995     Date of Admission:  6/3/2019  Date of Discharge::  6/6/2019   Admitting Physician:  6/6/2019  Discharge Physician:  Kedar Velez MD     Home clinic: [place], [place]          Admission Diagnoses:   Endometritis          Discharge Diagnosis:   Endometritis  Acute blood loss anemia          Procedures:    Admitted for IV abx       Pt received Venofer x 2 prior to discharge           Medications Prior to Admission:     Medications Prior to Admission   Medication Sig Dispense Refill Last Dose     amoxicillin-clavulanate (AUGMENTIN) 875-125 MG tablet Take 1 tablet by mouth 2 times daily for 10 days 20 tablet 0 6/3/2019 at Unknown time     enoxaparin (LOVENOX) 30 MG/0.3ML syringe Inject 1 mg/kg Subcutaneous   6/3/2019 at Unknown time     escitalopram (LEXAPRO) 20 MG tablet Take 20 mg by mouth daily   6/3/2019 at Unknown time     oxyCODONE (ROXICODONE) 5 MG tablet Take 5-10 mg by mouth   6/3/2019 at Unknown time     Prenatal Vit-Fe Fumarate-FA (PRENATAL VITAMIN PO) Take by mouth daily   6/2/2019 at Unknown time             Discharge Medications:   Pt will resume previously prescribed Lovenox and Augmentin          Consultations:   No consultations were requested during this admission          Brief History of Illness:   Pt was seen twice in the ED for fevers and uterine tenderness. She was admitted for 2 days of IV abx           Hospital Course:   After second visit to the ED pt was admitted for IV abx. Her elevated WBC normalized. Fevers resolved. Blood cultures were negative. Pt pain resolved and she was ready for discharge on hospital day #3. She will resume oral abx from previous visit to ED. Pt will also continue her Lovenox as an out-pt          Discharge Instructions and Follow-Up:   Discharge diet: Regular   Discharge activity: Lifting restricted to 20 pounds  No lifting, driving, or strenuous  exercise for 4 week(s)  No heavy lifting for 4 week(s)  No driving for 4 week(s)  No sex for 4 week(s)  No driving or operating machinery while on narcotic analgesics  Pelvic rest: abstain from intercourse and do not use tampons for 4 week(s)   Discharge follow-up: Follow up with primary care provider in 4-5 days   Wound care Drink plenty of fluids  Ice to area for comfort  Keep wound clean and dry           Discharge Disposition:   Discharged to home      Attestation:  I have reviewed today's vital signs, notes, medications, labs     Kedar Velez MD

## 2019-06-06 NOTE — PLAN OF CARE
Pt continues to have no complaints. Abx completed. Iron sucrose infusing via pump. Pt awaiting discharge upon completion of iron sucrose.

## 2019-06-09 LAB
BACTERIA SPEC CULT: NORMAL
BACTERIA SPEC CULT: NORMAL
SPECIMEN SOURCE: NORMAL
SPECIMEN SOURCE: NORMAL

## 2019-06-11 ENCOUNTER — OFFICE VISIT (OUTPATIENT)
Dept: OBGYN | Facility: OTHER | Age: 24
End: 2019-06-11
Attending: OBSTETRICS & GYNECOLOGY
Payer: COMMERCIAL

## 2019-06-11 VITALS
WEIGHT: 293 LBS | HEIGHT: 68 IN | DIASTOLIC BLOOD PRESSURE: 68 MMHG | SYSTOLIC BLOOD PRESSURE: 104 MMHG | BODY MASS INDEX: 44.41 KG/M2 | TEMPERATURE: 97.5 F

## 2019-06-11 PROCEDURE — 99212 OFFICE O/P EST SF 10 MIN: CPT | Performed by: OBSTETRICS & GYNECOLOGY

## 2019-06-11 ASSESSMENT — PAIN SCALES - GENERAL: PAINLEVEL: MILD PAIN (3)

## 2019-06-11 ASSESSMENT — MIFFLIN-ST. JEOR: SCORE: 2481.81

## 2019-06-11 NOTE — PROGRESS NOTES
S:   Follow-up hospitalization here for postop endometritis.  Had surgery in [place] 20 days ago.  Hospitalized here for 2 days with IV ATB.  No complaints. Feeling much better.  Denies fever, chills, GI or  symptoms.  Bleeding is light.    O:   Incision is clean and healing well.         Centrally is small area healing a little more slowly.    A:   Post  Endometritis resolving    P:   All follow-up with local physician in [place].  Finish all of antibiotics.

## 2019-06-11 NOTE — NURSING NOTE
"Chief Complaint   Patient presents with     Hospital F/U     6/3/19       Initial /68 (BP Location: Left arm, Cuff Size: Adult Large)   Temp 97.5  F (36.4  C) (Tympanic)   Ht 1.727 m (5' 8\")   Wt (!) 167.8 kg (370 lb)   BMI 56.26 kg/m   Estimated body mass index is 56.26 kg/m  as calculated from the following:    Height as of this encounter: 1.727 m (5' 8\").    Weight as of this encounter: 167.8 kg (370 lb).  Medication Reconciliation: complete    Rupa Cuenca LPN  "

## 2019-08-20 ENCOUNTER — APPOINTMENT (OUTPATIENT)
Dept: OCCUPATIONAL MEDICINE | Facility: OTHER | Age: 24
End: 2019-08-20

## 2019-08-20 PROCEDURE — 99000 SPECIMEN HANDLING OFFICE-LAB: CPT

## 2019-10-08 ENCOUNTER — MEDICAL CORRESPONDENCE (OUTPATIENT)
Dept: HEALTH INFORMATION MANAGEMENT | Facility: CLINIC | Age: 24
End: 2019-10-08

## 2020-07-19 ENCOUNTER — HOSPITAL ENCOUNTER (EMERGENCY)
Facility: HOSPITAL | Age: 25
Discharge: HOME | End: 2020-07-19
Attending: NURSE PRACTITIONER | Admitting: NURSE PRACTITIONER
Payer: COMMERCIAL

## 2020-07-19 VITALS
DIASTOLIC BLOOD PRESSURE: 85 MMHG | SYSTOLIC BLOOD PRESSURE: 118 MMHG | OXYGEN SATURATION: 96 % | TEMPERATURE: 99 F | RESPIRATION RATE: 20 BRPM

## 2020-07-19 DIAGNOSIS — H60.503 ACUTE OTITIS EXTERNA OF BOTH EARS, UNSPECIFIED TYPE: ICD-10-CM

## 2020-07-19 DIAGNOSIS — H60.503: Primary | ICD-10-CM

## 2020-07-19 PROCEDURE — 99213 OFFICE O/P EST LOW 20 MIN: CPT | Mod: Z6 | Performed by: NURSE PRACTITIONER

## 2020-07-19 PROCEDURE — G0463 HOSPITAL OUTPT CLINIC VISIT: HCPCS

## 2020-07-19 RX ORDER — CIPROFLOXACIN AND DEXAMETHASONE 3; 1 MG/ML; MG/ML
4 SUSPENSION/ DROPS AURICULAR (OTIC) 2 TIMES DAILY
Qty: 5 ML | Refills: 0 | Status: SHIPPED | OUTPATIENT
Start: 2020-07-19 | End: 2020-07-26

## 2020-07-19 NOTE — DISCHARGE INSTRUCTIONS
Use drops to your ears as prescribed.  Avoid inserting anything into your ear whenever possible.    Schedule appointment with your doctor in 1 week if no improvement in symptoms.    Return to emergency department for worsening concerning symptoms

## 2020-07-19 NOTE — ED TRIAGE NOTES
Patient presents with ear pain that she's been having off and on for about 2 weeks. States it started with some itching and drainage.

## 2020-07-19 NOTE — ED TRIAGE NOTES
Pt presents today with c/o bilateral ear pain for 2 weeks. States they were itching and she had some drainage.

## 2020-07-19 NOTE — ED PROVIDER NOTES
History     Chief Complaint   Patient presents with     Otalgia     HPI  Johanny Infante is a 25 year old female who presents to  for bilateral ear pain. Onset 2 weeks ago. She reports yellowish drainage from both the ears along with an itching sensation.  No fever, nasal congestion, cough, rhinorrhea.  She has had tubes placed when she was a child and no other ear surgeries.  She notes that she wears ear plugs at work.  Unable to tell me definitively if her hearing has changed.  Does report being seen by PCP earlier this year and prescribed Floxin drops which helped with the pain but not so much the itching.    Allergies:  No Known Allergies    Problem List:    Patient Active Problem List    Diagnosis Date Noted     Endometritis following delivery--post , IV ATBX---2019     Priority: Medium     Endometritis 2019     Priority: Medium     Encounter for triage in pregnant patient 10/02/2017     Priority: Medium     Uncontrolled pain 2015     Priority: Medium     Tobacco abuse      Priority: Medium     Obesity      Priority: Medium        Past Medical History:    Past Medical History:   Diagnosis Date     Obesity      Tobacco abuse        Past Surgical History:    Past Surgical History:   Procedure Laterality Date     ENT SURGERY      tonsils and adnoids     wisdom teeth[         Family History:    Family History   Problem Relation Age of Onset     Diabetes Mother        Social History:  Marital Status:  Single [1]  Social History     Tobacco Use     Smoking status: Current Every Day Smoker     Packs/day: 0.40     Smokeless tobacco: Never Used   Substance Use Topics     Alcohol use: No     Drug use: No        Medications:    ciprofloxacin-dexamethasone (CIPRODEX) 0.3-0.1 % otic suspension          Review of Systems   HENT: Positive for ear discharge and ear pain.    All other systems reviewed and are negative.      Physical Exam   BP: 118/85  Heart Rate: 81  Temp: 99  F (37.2   C)  Resp: 20  SpO2: 96 %      Physical Exam  Vitals signs and nursing note reviewed.   Constitutional:       Appearance: She is not ill-appearing or toxic-appearing.   HENT:      Head: Normocephalic.      Right Ear: Tympanic membrane normal.      Left Ear: Tympanic membrane and ear canal normal.      Ears:      Comments: Mild erythema to right ear canal.  No appreciable drainage from his ear.  Right TM pearly gray.  Left TM pearly gray with no appreciable drainage from this ear.  Mild tenderness with no appreciable swelling to bilateral ear canals.     Nose: Nose normal.      Mouth/Throat:      Mouth: Mucous membranes are moist.   Eyes:      Pupils: Pupils are equal, round, and reactive to light.   Neck:      Musculoskeletal: Neck supple.   Cardiovascular:      Rate and Rhythm: Normal rate and regular rhythm.      Heart sounds: Normal heart sounds.   Pulmonary:      Effort: Pulmonary effort is normal. No respiratory distress.      Breath sounds: Normal breath sounds. No wheezing.   Skin:     General: Skin is warm and dry.      Capillary Refill: Capillary refill takes less than 2 seconds.   Neurological:      Mental Status: She is alert and oriented to person, place, and time.         ED Course        Procedures             No results found for this or any previous visit (from the past 24 hour(s)).    Medications - No data to display    Assessments & Plan (with Medical Decision Making)     Mild erythema to right ear canal.  No appreciable drainage from his ear.  Right TM pearly gray.  Left TM pearly gray with no appreciable drainage from this ear.  Mild tenderness with no appreciable swelling to bilateral ear canals.  Patient appears to have acute swimmer's ear.  I suspect her regular use of earplugs at work may be contributing to some of her symptoms.  Will prescribe Ciprodex.  Discussed with patient that she may need to follow-up with ENT if symptoms continue to persist.  Review of her chart shows she was  diagnosed with acute swimmer's ear in April by PCP. Recommended following up with PCP in 1 week if no improvement in symptoms.  Return to emergency department for worsening concerning symptoms.  Patient verbalized understanding.    I have reviewed the nursing notes.    I have reviewed the findings, diagnosis, plan and need for follow up with the patient.      Discharge Medication List as of 7/19/2020  6:45 PM      START taking these medications    Details   ciprofloxacin-dexamethasone (CIPRODEX) 0.3-0.1 % otic suspension Place 4 drops into both ears 2 times daily for 7 days, Disp-5 mL,R-0, E-PrescribeSwitch to ofloxacin if cipro-dex is not covered by patient's insurance             Final diagnoses:   Acute otitis externa of both ears       7/19/2020   [facility]     Mpofu, Ralfudenciftikhar, CNP  07/19/20 1919

## 2020-07-19 NOTE — ED AVS SNAPSHOT
[facility]  [address]  [place] [address]  Phone:  [phone]                                    Johanny Infante   MRN: [id number]    Department:  [facility]   Date of Visit:  7/19/2020           After Visit Summary Signature Page    I have received my discharge instructions, and my questions have been answered. I have discussed any challenges I see with this plan with the nurse or doctor.    ..........................................................................................................................................  Patient/Patient Representative Signature      ..........................................................................................................................................  Patient Representative Print Name and Relationship to Patient    ..................................................               ................................................  Date                                   Time    ..........................................................................................................................................  Reviewed by Signature/Title    ...................................................              ..............................................  Date                                               Time          22EPIC Rev 08/18

## 2020-07-22 ENCOUNTER — HOSPITAL ENCOUNTER (EMERGENCY)
Facility: HOSPITAL | Age: 25
Discharge: STILL ADMITTED OR ADMINISTRATIVE | End: 2020-07-22
Payer: COMMERCIAL

## 2020-07-22 VITALS
OXYGEN SATURATION: 98 % | TEMPERATURE: 98.5 F | RESPIRATION RATE: 20 BRPM | DIASTOLIC BLOOD PRESSURE: 91 MMHG | SYSTOLIC BLOOD PRESSURE: 133 MMHG

## 2021-07-17 ENCOUNTER — HOSPITAL ENCOUNTER (EMERGENCY)
Facility: HOSPITAL | Age: 26
Discharge: HOME | End: 2021-07-17
Attending: EMERGENCY MEDICINE | Admitting: EMERGENCY MEDICINE
Payer: COMMERCIAL

## 2021-07-17 VITALS
TEMPERATURE: 99.3 F | SYSTOLIC BLOOD PRESSURE: 111 MMHG | HEART RATE: 88 BPM | DIASTOLIC BLOOD PRESSURE: 78 MMHG | OXYGEN SATURATION: 96 % | RESPIRATION RATE: 22 BRPM

## 2021-07-17 DIAGNOSIS — M62.830 BACK MUSCLE SPASM: ICD-10-CM

## 2021-07-17 LAB
ANION GAP SERPL CALCULATED.3IONS-SCNC: 8 MMOL/L (ref 3–14)
BASOPHILS # BLD AUTO: 0 10E3/UL (ref 0–0.2)
BASOPHILS NFR BLD AUTO: 0 %
BUN SERPL-MCNC: 13 MG/DL (ref 7–30)
CALCIUM SERPL-MCNC: 8.6 MG/DL (ref 8.5–10.1)
CHLORIDE BLD-SCNC: 110 MMOL/L (ref 94–109)
CO2 SERPL-SCNC: 23 MMOL/L (ref 20–32)
CREAT SERPL-MCNC: 0.87 MG/DL (ref 0.52–1.04)
EOSINOPHIL # BLD AUTO: 0.1 10E3/UL (ref 0–0.7)
EOSINOPHIL NFR BLD AUTO: 1 %
ERYTHROCYTE [DISTWIDTH] IN BLOOD BY AUTOMATED COUNT: 13.4 % (ref 10–15)
GFR SERPL CREATININE-BSD FRML MDRD: >90 ML/MIN/1.73M2
GLUCOSE BLD-MCNC: 86 MG/DL (ref 70–99)
HCT VFR BLD AUTO: 41.1 % (ref 35–47)
HGB BLD-MCNC: 13.8 G/DL (ref 11.7–15.7)
IMM GRANULOCYTES # BLD: 0 10E3/UL
IMM GRANULOCYTES NFR BLD: 0 %
LYMPHOCYTES # BLD AUTO: 2.7 10E3/UL (ref 0.8–5.3)
LYMPHOCYTES NFR BLD AUTO: 39 %
MCH RBC QN AUTO: 28.9 PG (ref 26.5–33)
MCHC RBC AUTO-ENTMCNC: 33.6 G/DL (ref 31.5–36.5)
MCV RBC AUTO: 86 FL (ref 78–100)
MONOCYTES # BLD AUTO: 0.5 10E3/UL (ref 0–1.3)
MONOCYTES NFR BLD AUTO: 7 %
NEUTROPHILS # BLD AUTO: 3.7 10E3/UL (ref 1.6–8.3)
NEUTROPHILS NFR BLD AUTO: 53 %
NRBC # BLD AUTO: 0 10E3/UL
NRBC BLD AUTO-RTO: 0 /100
PLATELET # BLD AUTO: 346 10E3/UL (ref 150–450)
POTASSIUM BLD-SCNC: 3.5 MMOL/L (ref 3.4–5.3)
RBC # BLD AUTO: 4.78 10E6/UL (ref 3.8–5.2)
SODIUM SERPL-SCNC: 141 MMOL/L (ref 133–144)
WBC # BLD AUTO: 7.1 10E3/UL (ref 4–11)

## 2021-07-17 PROCEDURE — 99284 EMERGENCY DEPT VISIT MOD MDM: CPT | Mod: 25

## 2021-07-17 PROCEDURE — 80048 BASIC METABOLIC PNL TOTAL CA: CPT | Performed by: EMERGENCY MEDICINE

## 2021-07-17 PROCEDURE — 250N000011 HC RX IP 250 OP 636: Performed by: EMERGENCY MEDICINE

## 2021-07-17 PROCEDURE — 250N000012 HC RX MED GY IP 250 OP 636 PS 637: Performed by: EMERGENCY MEDICINE

## 2021-07-17 PROCEDURE — 96374 THER/PROPH/DIAG INJ IV PUSH: CPT

## 2021-07-17 PROCEDURE — 99284 EMERGENCY DEPT VISIT MOD MDM: CPT | Performed by: EMERGENCY MEDICINE

## 2021-07-17 PROCEDURE — 96375 TX/PRO/DX INJ NEW DRUG ADDON: CPT

## 2021-07-17 PROCEDURE — 36415 COLL VENOUS BLD VENIPUNCTURE: CPT | Performed by: EMERGENCY MEDICINE

## 2021-07-17 PROCEDURE — 85025 COMPLETE CBC W/AUTO DIFF WBC: CPT | Performed by: EMERGENCY MEDICINE

## 2021-07-17 RX ORDER — KETOROLAC TROMETHAMINE 30 MG/ML
30 INJECTION, SOLUTION INTRAMUSCULAR; INTRAVENOUS ONCE
Status: COMPLETED | OUTPATIENT
Start: 2021-07-17 | End: 2021-07-17

## 2021-07-17 RX ORDER — IBUPROFEN 800 MG/1
800 TABLET, FILM COATED ORAL EVERY 8 HOURS PRN
Qty: 30 TABLET | Refills: 0 | Status: SHIPPED | OUTPATIENT
Start: 2021-07-17 | End: 2021-07-27

## 2021-07-17 RX ORDER — HYDROMORPHONE HYDROCHLORIDE 1 MG/ML
0.5 INJECTION, SOLUTION INTRAMUSCULAR; INTRAVENOUS; SUBCUTANEOUS ONCE
Status: COMPLETED | OUTPATIENT
Start: 2021-07-17 | End: 2021-07-17

## 2021-07-17 RX ORDER — PREDNISONE 20 MG/1
60 TABLET ORAL ONCE
Status: COMPLETED | OUTPATIENT
Start: 2021-07-17 | End: 2021-07-17

## 2021-07-17 RX ORDER — PREDNISONE 20 MG/1
60 TABLET ORAL DAILY
Qty: 12 TABLET | Refills: 0 | Status: SHIPPED | OUTPATIENT
Start: 2021-07-17 | End: 2021-07-21

## 2021-07-17 RX ORDER — DIAZEPAM 10 MG/2ML
5 INJECTION, SOLUTION INTRAMUSCULAR; INTRAVENOUS ONCE
Status: COMPLETED | OUTPATIENT
Start: 2021-07-17 | End: 2021-07-17

## 2021-07-17 RX ORDER — METHOCARBAMOL 500 MG/1
500 TABLET, FILM COATED ORAL EVERY 6 HOURS PRN
Qty: 30 TABLET | Refills: 0 | Status: SHIPPED | OUTPATIENT
Start: 2021-07-17 | End: 2022-08-06

## 2021-07-17 RX ORDER — HALOPERIDOL 5 MG/ML
5 INJECTION INTRAMUSCULAR ONCE
Status: COMPLETED | OUTPATIENT
Start: 2021-07-17 | End: 2021-07-17

## 2021-07-17 RX ADMIN — KETOROLAC TROMETHAMINE 30 MG: 30 INJECTION, SOLUTION INTRAMUSCULAR; INTRAVENOUS at 03:00

## 2021-07-17 RX ADMIN — HALOPERIDOL LACTATE 5 MG: 5 INJECTION, SOLUTION INTRAMUSCULAR at 03:52

## 2021-07-17 RX ADMIN — PREDNISONE 60 MG: 20 TABLET ORAL at 03:04

## 2021-07-17 RX ADMIN — HYDROMORPHONE HYDROCHLORIDE 0.5 MG: 1 INJECTION, SOLUTION INTRAMUSCULAR; INTRAVENOUS; SUBCUTANEOUS at 03:06

## 2021-07-17 RX ADMIN — DIAZEPAM 5 MG: 5 INJECTION, SOLUTION INTRAMUSCULAR; INTRAVENOUS at 03:11

## 2021-07-17 ASSESSMENT — ENCOUNTER SYMPTOMS
PSYCHIATRIC NEGATIVE: 1
VOMITING: 0
NEUROLOGICAL NEGATIVE: 1
FREQUENCY: 0
FLANK PAIN: 1
HEMATURIA: 0
GASTROINTESTINAL NEGATIVE: 1
RESPIRATORY NEGATIVE: 1
DYSURIA: 0
CONSTITUTIONAL NEGATIVE: 1
NAUSEA: 0
BACK PAIN: 1
CARDIOVASCULAR NEGATIVE: 1

## 2021-07-17 NOTE — ED NOTES
Pt reports that she is unable to state where the pain is coming from. She reports that she feels like it starts in her knee and goes up to her hip/back, then she thinks it goes from her back into her knee. Did attempt to have pt lie down, she became even more uncomfortable and wanted to stand right away. Pt is almost hysterical at this time. Crying, hyperventilating, and screaming. Pt did calm down after the provider talked to her.

## 2021-07-17 NOTE — ED TRIAGE NOTES
Reports that her left knee was aching today. When she woke up tonight she reorts that it was much worse. Reports the pain is in her let knee, and up into the hip and back. Reports that she had to have help getting out of bed.

## 2021-07-17 NOTE — ED NOTES
"Pt continues to be very tearful and upset. Pt states, \"I think I'm panicking. I don't know what's wrong and it's not getting any better.\" Pt has tried multiple different positions and is not able to find a comfortable position.   "

## 2021-07-17 NOTE — ED NOTES
Pt reports now that she feels her pain is improving. She rates it as a 4/10 at rest and a 7/10 with movement. Pt does report that she feels tired and would like to rest.

## 2021-07-17 NOTE — ED PROVIDER NOTES
History     Chief Complaint   Patient presents with     Knee Pain     Back Pain     HPI  Johanny Infante is a 26 year old female who presents here with left knee pain that is now gone up into left side of her back. The patient denies any trauma. The patient had a little bit of pain yesterday in her knee but now is much worse if she states that she cannot even move without it hurting. Her pain is rated as a 10 out of 10. She is never had this pain before. The patient denies any abdominal pain with this. The patient has no bowel or bladder incontinence and no perineal paresthesias. She denies any dysuria, hematuria, frequency urgency. She denies any fever, chills, sweats and has no chest pain or shortness of breath. She has not tried anything for the pain as of yet.    Allergies:  No Known Allergies    Problem List:    Patient Active Problem List    Diagnosis Date Noted     Endometritis following delivery--post , IV ATBX---2019     Priority: Medium     Endometritis 2019     Priority: Medium     Encounter for triage in pregnant patient 10/02/2017     Priority: Medium     Uncontrolled pain 2015     Priority: Medium     Tobacco abuse      Priority: Medium     Obesity      Priority: Medium        Past Medical History:    Past Medical History:   Diagnosis Date     Obesity      Tobacco abuse        Past Surgical History:    Past Surgical History:   Procedure Laterality Date     ENT SURGERY      tonsils and adnoids     wisdom teeth[         Family History:    Family History   Problem Relation Age of Onset     Diabetes Mother        Social History:  Marital Status:  Single [1]  Social History     Tobacco Use     Smoking status: Current Every Day Smoker     Packs/day: 0.40     Smokeless tobacco: Never Used   Substance Use Topics     Alcohol use: No     Drug use: No        Medications:    ibuprofen (ADVIL/MOTRIN) 800 MG tablet  methocarbamol (ROBAXIN) 500 MG tablet  predniSONE (DELTASONE) 20  MG tablet          Review of Systems   Constitutional: Negative.    HENT: Negative.    Respiratory: Negative.    Cardiovascular: Negative.    Gastrointestinal: Negative.  Negative for nausea and vomiting.   Genitourinary: Positive for flank pain. Negative for dysuria, frequency, hematuria and urgency.   Musculoskeletal: Positive for back pain.   Neurological: Negative.    Psychiatric/Behavioral: Negative.    All other systems reviewed and are negative.      Physical Exam   BP: (!) 163/117  Pulse: (!) 132  Temp: 99.3  F (37.4  C)  Resp: 22  SpO2: 98 %      Physical Exam  Vitals and nursing note reviewed.   Constitutional:       General: She is not in acute distress.     Appearance: Normal appearance. She is obese. She is not ill-appearing.   HENT:      Head: Normocephalic and atraumatic.   Cardiovascular:      Rate and Rhythm: Normal rate and regular rhythm.      Pulses: Normal pulses.      Heart sounds: Normal heart sounds. No murmur heard.     Pulmonary:      Effort: Pulmonary effort is normal.      Breath sounds: Normal breath sounds. No wheezing, rhonchi or rales.   Abdominal:      General: Abdomen is flat. There is no distension.      Palpations: Abdomen is soft.      Tenderness: There is no abdominal tenderness. There is no guarding or rebound.   Musculoskeletal:         General: Tenderness present. No swelling.      Cervical back: Normal range of motion and neck supple. No rigidity or tenderness.      Comments: Mild tenderness palpation of the left side of her back laterally.   Skin:     General: Skin is warm and dry.   Neurological:      General: No focal deficit present.      Mental Status: She is alert.   Psychiatric:         Mood and Affect: Mood normal.         Behavior: Behavior normal.         ED Course         Results for orders placed or performed during the hospital encounter of 07/17/21 (from the past 24 hour(s))   CBC with platelets differential    Narrative    The following orders were created for  panel order CBC with platelets differential.  Procedure                               Abnormality         Status                     ---------                               -----------         ------                     CBC with platelets and d...[[id number]]                      Final result                 Please view results for these tests on the individual orders.   Basic metabolic panel   Result Value Ref Range    Sodium 141 133 - 144 mmol/L    Potassium 3.5 3.4 - 5.3 mmol/L    Chloride 110 (H) 94 - 109 mmol/L    Carbon Dioxide (CO2) 23 20 - 32 mmol/L    Anion Gap 8 3 - 14 mmol/L    Urea Nitrogen 13 7 - 30 mg/dL    Creatinine 0.87 0.52 - 1.04 mg/dL    Calcium 8.6 8.5 - 10.1 mg/dL    Glucose 86 70 - 99 mg/dL    GFR Estimate >90 >60 mL/min/1.73m2   CBC with platelets and differential   Result Value Ref Range    WBC Count 7.1 4.0 - 11.0 10e3/uL    RBC Count 4.78 3.80 - 5.20 10e6/uL    Hemoglobin 13.8 11.7 - 15.7 g/dL    Hematocrit 41.1 35.0 - 47.0 %    MCV 86 78 - 100 fL    MCH 28.9 26.5 - 33.0 pg    MCHC 33.6 31.5 - 36.5 g/dL    RDW 13.4 10.0 - 15.0 %    Platelet Count 346 150 - 450 10e3/uL    % Neutrophils 53 %    % Lymphocytes 39 %    % Monocytes 7 %    % Eosinophils 1 %    % Basophils 0 %    % Immature Granulocytes 0 %    NRBCs per 100 WBC 0 <1 /100    Absolute Neutrophils 3.7 1.6 - 8.3 10e3/uL    Absolute Lymphocytes 2.7 0.8 - 5.3 10e3/uL    Absolute Monocytes 0.5 0.0 - 1.3 10e3/uL    Absolute Eosinophils 0.1 0.0 - 0.7 10e3/uL    Absolute Basophils 0.0 0.0 - 0.2 10e3/uL    Absolute Immature Granulocytes 0.0 <=0.0 10e3/uL    Absolute NRBCs 0.0 10e3/uL       Medications   ketorolac (TORADOL) injection 30 mg (30 mg Intravenous Given 7/17/21 0300)   diazepam (VALIUM) injection 5 mg (5 mg Intravenous Given 7/17/21 0311)   predniSONE (DELTASONE) tablet 60 mg (60 mg Oral Given 7/17/21 0304)   HYDROmorphone (PF) (DILAUDID) injection 0.5 mg (0.5 mg Intravenous Given 7/17/21 0306)   haloperidol lactate (HALDOL)  injection 5 mg (5 mg Intravenous Given 7/17/21 0352)       Assessments & Plan (with Medical Decision Making)     I have reviewed the nursing notes.    I have reviewed the findings, diagnosis, plan and need for follow up with the patient.  Medical decision making:  This is a 26-year-old female who presents here with left-sided knee pain that goes into her left hip and into her back. Patient had only slight pain in her knee yesterday but it got much worse tonight. The patient seems to be quite uncomfortable.     This does not seem to be like a kidney stone since the patient's pain goes all the way down to her left knee. This is possibly sciatica but her pain does go up into the lateral aspect of her back. This seems to be more of a muscle spasm type picture.    The patient did have an IV established. She was given 30 mg of IV Toradol, 5 mg of IV Valium and also 60 mg p.o. prednisone. She was also given 0.5 mg of IV Dilaudid. None of these medications seem to help. She then was given 5 mg of IV Haldol and within a short period of time she felt much better and she was able to sleep. When the patient woke up she felt like her pain was much better under control and she is able to ambulate.    The patient did have some blood work done. The patient has a white count of 7.1, hemoglobin of 13.8 with platelet count of 346. Electrolytes all look normal. Glucose is 86 and the patient's creatinine is 0.87.    The patient is on her menstrual cycle and so a urinalysis will not be helpful to determine whether the patient has a kidney stone or not. My suspicion is very low that she has a kidney stone I do not think we need to do any imaging studies. I do not think x-rays are difficult because the patient does not have any history of trauma. The patient does feel well and the patient is able to ambulate so we are going to let the patient go home at this time. We agreed to treat the patient for possible muscle spasms and the patient be  placed on prednisone, Robaxin and also ibuprofen. We will have the patient follow-up with her primary care physician also later this week. Patient will be discharged home at this time.    New Prescriptions    IBUPROFEN (ADVIL/MOTRIN) 800 MG TABLET    Take 1 tablet (800 mg) by mouth every 8 hours as needed for moderate pain    METHOCARBAMOL (ROBAXIN) 500 MG TABLET    Take 1 tablet (500 mg) by mouth every 6 hours as needed for muscle spasms    PREDNISONE (DELTASONE) 20 MG TABLET    Take 3 tablets (60 mg) by mouth daily for 4 days       Final diagnoses:   Back muscle spasm       7/17/2021   [facility]     Jose Saldana MD  07/17/21 1627

## 2021-07-17 NOTE — ED NOTES
Pt is able to get up and walk. Pt now reports her pain as a 2/10 at rest and a 3/10 when up walking and moving. Pt is comfortable with going home at this time.

## 2022-07-29 ENCOUNTER — HOSPITAL ENCOUNTER (EMERGENCY)
Facility: HOSPITAL | Age: 27
Discharge: HOME | End: 2022-07-29
Attending: NURSE PRACTITIONER | Admitting: NURSE PRACTITIONER
Payer: COMMERCIAL

## 2022-07-29 VITALS
SYSTOLIC BLOOD PRESSURE: 103 MMHG | HEART RATE: 81 BPM | DIASTOLIC BLOOD PRESSURE: 72 MMHG | TEMPERATURE: 98.3 F | RESPIRATION RATE: 16 BRPM | OXYGEN SATURATION: 99 %

## 2022-07-29 DIAGNOSIS — M54.40 BACK PAIN OF LUMBAR REGION WITH SCIATICA: ICD-10-CM

## 2022-07-29 PROCEDURE — 99213 OFFICE O/P EST LOW 20 MIN: CPT | Performed by: NURSE PRACTITIONER

## 2022-07-29 PROCEDURE — 250N000013 HC RX MED GY IP 250 OP 250 PS 637: Performed by: NURSE PRACTITIONER

## 2022-07-29 PROCEDURE — 250N000011 HC RX IP 250 OP 636: Performed by: NURSE PRACTITIONER

## 2022-07-29 PROCEDURE — 96372 THER/PROPH/DIAG INJ SC/IM: CPT | Performed by: NURSE PRACTITIONER

## 2022-07-29 PROCEDURE — G0463 HOSPITAL OUTPT CLINIC VISIT: HCPCS | Mod: 25

## 2022-07-29 RX ORDER — TIZANIDINE 2 MG/1
2 TABLET ORAL 3 TIMES DAILY PRN
Qty: 12 TABLET | Refills: 0 | Status: SHIPPED | OUTPATIENT
Start: 2022-07-29 | End: 2022-08-10

## 2022-07-29 RX ORDER — TIZANIDINE 2 MG/1
2 TABLET ORAL EVERY 6 HOURS PRN
Status: DISCONTINUED | OUTPATIENT
Start: 2022-07-29 | End: 2022-07-29 | Stop reason: HOSPADM

## 2022-07-29 RX ORDER — KETOROLAC TROMETHAMINE 30 MG/ML
30 INJECTION, SOLUTION INTRAMUSCULAR; INTRAVENOUS ONCE
Status: COMPLETED | OUTPATIENT
Start: 2022-07-29 | End: 2022-07-29

## 2022-07-29 RX ADMIN — KETOROLAC TROMETHAMINE 30 MG: 30 INJECTION, SOLUTION INTRAMUSCULAR; INTRAVENOUS at 17:05

## 2022-07-29 RX ADMIN — TIZANIDINE 2 MG: 2 TABLET ORAL at 17:05

## 2022-07-29 ASSESSMENT — ENCOUNTER SYMPTOMS
CHILLS: 0
FLANK PAIN: 0
NAUSEA: 0
NUMBNESS: 0
LIGHT-HEADEDNESS: 0
ACTIVITY CHANGE: 1
VOMITING: 0
FATIGUE: 0
BACK PAIN: 1
SHORTNESS OF BREATH: 0
DIARRHEA: 0
DIZZINESS: 0
DYSURIA: 0
HEADACHES: 0
ABDOMINAL PAIN: 0
FEVER: 0

## 2022-07-29 NOTE — ED PROVIDER NOTES
History     Chief Complaint   Patient presents with     Back Pain     HPI  Johanny Infante is a 27 year old female who presents with acute on chronic back pain that occurred when she was lifting up her child.  Ongoing for the past 10 days.  Has applied cold and heat, tried stretching, and taken acetaminophen and ibuprofen.  Last dose ibuprofen this morning.  Smoker.  Denies bowel and bladder retention or incontinency. Denies fevers, chills, nausea, vomiting, diarrhea, and shortness of breath.    Back Pain       Duration: ten years chronic. Ten days acute        Specific cause: lifting, turning/bending, slept     Description:   Location of pain: low back left  Character of pain: dull ache, constant and intermittent sharp at times.  Pain radiation:radiates into the right buttocks and radiates into the right leg radiates into posterior leg to just below the knee  New numbness or weakness in legs, not attributed to pain:  no     Intensity: Currently 5/10    History:   Pain interferes with job: YES, and activities of daily livign  History of back problems: recurrent self limited episodes of low back pain in the past  Any previous MRI or X-rays: Yes--at MRI at [facility].  Date at least four years ago  Sees a specialist for back pain:  No  Therapies tried without relief: acetaminophen (Tylenol), cold, heat, NSAIDs and stretch. Ibuprofen this morning     Alleviating factors:   Improved by: lying flat    Precipitating factors:  Worsened by: Lifting, Standing and Walking    Accompanying Signs & Symptoms:  Risk of Fracture:  None  Risk of Cauda Equina:  None  Risk of Infection:  None  Risk of Cancer:  None  Risk of Ankylosing Spondylitis:  Onset at age <35, male, AND morning back stiffness. no     Allergies:  No Known Allergies    Problem List:    Patient Active Problem List    Diagnosis Date Noted     Endometritis following delivery--post , IV ATBX---2019     Priority: Medium     Endometritis  06/04/2019     Priority: Medium     Encounter for triage in pregnant patient 10/02/2017     Priority: Medium     Uncontrolled pain 05/19/2015     Priority: Medium     Tobacco abuse      Priority: Medium     Obesity      Priority: Medium        Past Medical History:    Past Medical History:   Diagnosis Date     Obesity      Tobacco abuse        Past Surgical History:    Past Surgical History:   Procedure Laterality Date     ENT SURGERY      tonsils and adnoids     wisdom teeth[         Family History:    Family History   Problem Relation Age of Onset     Diabetes Mother        Social History:  Marital Status:  Single [1]  Social History     Tobacco Use     Smoking status: Current Every Day Smoker     Packs/day: 0.40     Smokeless tobacco: Never Used   Substance Use Topics     Alcohol use: No     Drug use: No        Medications:    tiZANidine (ZANAFLEX) 2 MG tablet  methocarbamol (ROBAXIN) 500 MG tablet          Review of Systems   Constitutional: Positive for activity change. Negative for chills, fatigue and fever.   Respiratory: Negative for shortness of breath.    Gastrointestinal: Negative for abdominal pain, diarrhea, nausea and vomiting.        Lmp just completing  Last BM this morning normal for her   Genitourinary: Negative for dysuria and flank pain.   Musculoskeletal: Positive for back pain.   Neurological: Negative for dizziness, light-headedness, numbness and headaches.       Physical Exam   BP: 103/72  Pulse: 81  Temp: 98.3  F (36.8  C)  Resp: 16  SpO2: 99 %      Physical Exam  Vitals and nursing note reviewed.   Constitutional:       General: She is in acute distress (mild to moderate).      Appearance: She is overweight.   Cardiovascular:      Rate and Rhythm: Normal rate and regular rhythm.      Heart sounds: Normal heart sounds. No murmur heard.  Pulmonary:      Effort: Pulmonary effort is normal. No respiratory distress.      Breath sounds: Normal breath sounds. No wheezing, rhonchi or rales.    Musculoskeletal:         General: Tenderness present. No swelling.      Cervical back: Normal range of motion. No tenderness or bony tenderness. Normal range of motion.      Thoracic back: Tenderness present. No bony tenderness. Decreased range of motion.      Lumbar back: No tenderness or bony tenderness. Decreased range of motion.      Comments: Nature of onset picking up young child  Location left thoracic/lumbar region  Character dull constant pain that is sharp at times  Radiation posterior left leg to just below the knee  Pain at night yes    Musculoskeletal: Lumbar and thoracic spine without gross deformity, rash, erythema, or ecchymosis. No trauma noted.        No tenderness, spasms, pain, or step-offs noted with spinal palpation.  Paraspinous muscle tenderness over the T12/L1 region. No numbness or tingling in pelvic or gluteal muscles (spinal anesthesia). Pain radiates into left posterior legs.  Pain is same as chronic pain. Denies incontinency of bowel or bladder.       Inspection:  Is able to get up from chair moderate difficulty  Ambulation hunched forward  Posture ok  Shoulders even bilaterally    iliac crest even bilaterally       Flexion at waist: 20  degrees. extension 15 degrees  Lateral flexion:   R) 30 degrees.  L)25 degrees  Stand on tip toes (S1). yes  Rock on heels (L4).  yes  Flex toes up (L5). yes    L1, 2, 3:  Bend right knee from standing position to 70 degrees before having pain.  Bend left  knee from standing position to 50 degrees before having pain.    L2, 3: quad muscles extend leg and hold against resistance. R) yes  L) yes    L4: (anterior tibialis - dorsi flex foot R) yes  L)yes    L5: press great toe up  R) yes  L) yes    S1: gastrocnemius flex toe down R) yes L) yes    Sensation bilaterally in feet: L4 - medial malleolus yes         L 5 - dorsal web space 1st and 2nd toe yes         S1 - lateral malleolus yes  Strength equal and strong bilaterally  No neuro deficits, no  bowel/bladder retention or incontinence. No spinal anesthesia.      Skin:     General: Skin is warm and dry.      Capillary Refill: Capillary refill takes less than 2 seconds.      Findings: No bruising or erythema.   Neurological:      Mental Status: She is alert and oriented to person, place, and time.   Psychiatric:         Behavior: Behavior normal.         ED Course                 Procedures           No results found for this or any previous visit (from the past 24 hour(s)).    Medications   tiZANidine (ZANAFLEX) tablet 2 mg (2 mg Oral Given 7/29/22 1705)   ketorolac (TORADOL) injection 30 mg (30 mg Intramuscular Given 7/29/22 1705)       Assessments & Plan (with Medical Decision Making)     I have reviewed the nursing notes.    I have reviewed the findings, diagnosis, plan and need for follow up with the patient.  (M54.40) Back pain of lumbar region with sciatica  Comment: 27 year old female who presents with acute on chronic back pain that occurred when she was lifting up her child.  Ongoing for the past 10 days.  Has applied cold and heat, tried stretching, and taken acetaminophen and ibuprofen.  Last dose ibuprofen this morning.  Smoker.  Denies bowel and bladder retention or incontinency. Denies fevers, chills, nausea, vomiting, diarrhea, and shortness of breath.    MDM: NHT. Lungs CTA  See back assessment    Ketorolac 30 mg given IM and tizanidine 2 mg given orally.  Walking straighter and moving without demonstrating pain after medication given.    Plan: Tizanidine 3 times daily. Education provided and/or discussed for this/these medication and for back care tips.  Ice to affected area 20 minutes every hour as needed for comfort. After 48 hours you can apply heat. After 11 pm May start: Ibuprofen 600 to 800 mg (3 - 4 tabs of over the counter med) every six to eight hours as needed;not to exceed maximum amount of 3200 mg in 24 hours. Take with food. Tylenol 650 to 1000 mg every four to six hours as  needed (not to exceed more than 4000 mg in a 24 hour period). May use interchangeably.  Follow up with primary provider  as needed    Do not drive motor vehicles or operate heavy equipment while taking muscle relaxors.  These discharge instructions and medications were reviewed with her and understanding verbalized.    This document was prepared using a combination of typing and voice generated software.  While every attempt was made for accuracy, spelling and grammatical errors may exist.    Discharge Medication List as of 7/29/2022  5:25 PM      START taking these medications    Details   tiZANidine (ZANAFLEX) 2 MG tablet Take 1 tablet (2 mg) by mouth 3 times daily as needed for muscle spasms, Disp-12 tablet, R-0, E-Prescribe             Final diagnoses:   Back pain of lumbar region with sciatica       7/29/2022   [facility]       Melani Mireles, CHICHO  07/29/22 3650

## 2022-07-29 NOTE — DISCHARGE INSTRUCTIONS
Keep affected extremity elevated as much as possible for next 24 - 48 hours. Ice to affected area 20 minutes every hour as needed for comfort. After 48 hours you can apply heat. After 11 pm May start: Ibuprofen 600 to 800 mg (3 - 4 tabs of over the counter med) every six to eight hours as needed;not to exceed maximum amount of 3200 mg in 24 hours. Take with food. Tylenol 650 to 1000 mg every four to six hours as needed (not to exceed more than 4000 mg in a 24 hour period). May use interchangeably.  Follow up with primary provider  as needed    Do not drive motor vehicles or operate heavy equipment while taking muscle relaxors.

## 2022-07-29 NOTE — ED TRIAGE NOTES
Pt presents with chronic back pain for past week and a half. Pt states primary moved and cant get usual muscle relaxer. Pt rates pain 7/10. Pt took Ibuprofen this am.

## 2022-08-06 ENCOUNTER — HOSPITAL ENCOUNTER (EMERGENCY)
Facility: HOSPITAL | Age: 27
Discharge: HOME | End: 2022-08-06
Attending: NURSE PRACTITIONER | Admitting: NURSE PRACTITIONER
Payer: COMMERCIAL

## 2022-08-06 VITALS
SYSTOLIC BLOOD PRESSURE: 113 MMHG | TEMPERATURE: 98.5 F | OXYGEN SATURATION: 98 % | RESPIRATION RATE: 20 BRPM | HEART RATE: 96 BPM | DIASTOLIC BLOOD PRESSURE: 78 MMHG

## 2022-08-06 DIAGNOSIS — M54.42 CHRONIC LEFT-SIDED LOW BACK PAIN WITH LEFT-SIDED SCIATICA: ICD-10-CM

## 2022-08-06 DIAGNOSIS — G89.29 CHRONIC LEFT-SIDED LOW BACK PAIN WITH LEFT-SIDED SCIATICA: ICD-10-CM

## 2022-08-06 PROCEDURE — 96372 THER/PROPH/DIAG INJ SC/IM: CPT | Performed by: NURSE PRACTITIONER

## 2022-08-06 PROCEDURE — G0463 HOSPITAL OUTPT CLINIC VISIT: HCPCS | Mod: 25

## 2022-08-06 PROCEDURE — 250N000011 HC RX IP 250 OP 636: Performed by: NURSE PRACTITIONER

## 2022-08-06 PROCEDURE — 99213 OFFICE O/P EST LOW 20 MIN: CPT | Performed by: NURSE PRACTITIONER

## 2022-08-06 RX ORDER — CYCLOBENZAPRINE HCL 10 MG
10 TABLET ORAL 3 TIMES DAILY PRN
Qty: 30 TABLET | Refills: 0 | Status: SHIPPED | OUTPATIENT
Start: 2022-08-06 | End: 2023-04-01

## 2022-08-06 RX ORDER — KETOROLAC TROMETHAMINE 15 MG/ML
15 INJECTION, SOLUTION INTRAMUSCULAR; INTRAVENOUS ONCE
Status: COMPLETED | OUTPATIENT
Start: 2022-08-06 | End: 2022-08-06

## 2022-08-06 RX ORDER — METHOCARBAMOL 500 MG/1
500 TABLET, FILM COATED ORAL EVERY 6 HOURS PRN
Qty: 30 TABLET | Refills: 0 | Status: SHIPPED | OUTPATIENT
Start: 2022-08-06 | End: 2024-04-25

## 2022-08-06 RX ADMIN — KETOROLAC TROMETHAMINE 15 MG: 15 INJECTION, SOLUTION INTRAMUSCULAR; INTRAVENOUS at 13:31

## 2022-08-06 NOTE — ED TRIAGE NOTES
Pt stated she has chronic back pain and is out of her muscle relaxant. States would like a refill. States she has an appointment with primary in September.

## 2022-08-06 NOTE — ED TRIAGE NOTES
Pt presents with c/o chronic back pain. Requesting for a refill on muscle relaxer since she is out or a different one. Pt states she has an appointment in September for a new primary. Was directed to come here until she has her appointment. States the tizanidine did not help at all. States she is having numbness in her toes. Originally prescribed to methocarbamol but states the more she takes that the less it works.

## 2022-08-06 NOTE — ED PROVIDER NOTES
History     Chief Complaint   Patient presents with     Back Pain     HPI   History presenting illness given by patient.    Johanny Infante is a 27 year old female who presents for medication refills for her chronic back pain.  She has run out of her muscle relaxer and her methocarbamol and would like a refill until she can be seen with her primary care provider .  Her pain is the same usual low back pain that is to the left buttocks that radiates down her leg.  Nothing has changed.  She has had 4 months of physical therapy for this sciatic pain.  She rates her pain a 10/10 and laying on the floor makes it better.  She denies any numbness or tingling, loss of bowel or bladder.    Allergies:  No Known Allergies    Problem List:    Patient Active Problem List    Diagnosis Date Noted     Endometritis following delivery--post , IV ATBX---2019     Priority: Medium     Endometritis 2019     Priority: Medium     Encounter for triage in pregnant patient 10/02/2017     Priority: Medium     Uncontrolled pain 2015     Priority: Medium     Tobacco abuse      Priority: Medium     Obesity      Priority: Medium        Past Medical History:    Past Medical History:   Diagnosis Date     Obesity      Tobacco abuse        Past Surgical History:    Past Surgical History:   Procedure Laterality Date     ENT SURGERY      tonsils and adnoids     wisdom teeth[         Family History:    Family History   Problem Relation Age of Onset     Diabetes Mother        Social History:  Marital Status:  Single [1]  Social History     Tobacco Use     Smoking status: Current Every Day Smoker     Packs/day: 0.40     Smokeless tobacco: Never Used   Substance Use Topics     Alcohol use: No     Drug use: No        Medications:    cyclobenzaprine (FLEXERIL) 10 MG tablet  methocarbamol (ROBAXIN) 500 MG tablet          Review of Systems   Constitutional: Negative.    HENT: Negative.    Eyes: Negative.     Respiratory: Negative.    Cardiovascular: Negative.    Gastrointestinal: Negative.    Endocrine: Negative.    Genitourinary: Negative.    Musculoskeletal: Positive for back pain.   Skin: Negative.    Allergic/Immunologic: Negative.    Neurological: Positive for dizziness.   Hematological: Negative.    Psychiatric/Behavioral: Negative.        Physical Exam   BP: 113/78  Pulse: 96  Temp: 98.5  F (36.9  C)  Resp: 20  SpO2: 98 %      Physical Exam  Vitals and nursing note reviewed.   Constitutional:       General: She is not in acute distress.     Appearance: Normal appearance.   HENT:      Head: Normocephalic.      Nose: Nose normal.      Mouth/Throat:      Mouth: Mucous membranes are moist.      Pharynx: Oropharynx is clear.   Eyes:      Conjunctiva/sclera: Conjunctivae normal.      Pupils: Pupils are equal, round, and reactive to light.   Cardiovascular:      Rate and Rhythm: Normal rate and regular rhythm.      Pulses: Normal pulses.      Heart sounds: Normal heart sounds.   Pulmonary:      Effort: Pulmonary effort is normal.      Breath sounds: Normal breath sounds.   Abdominal:      General: Abdomen is flat. Bowel sounds are normal.      Palpations: Abdomen is soft.   Musculoskeletal:         General: Normal range of motion.      Cervical back: Normal range of motion.      Lumbar back: Spasms and tenderness present. No bony tenderness. Normal range of motion. Negative right straight leg raise test and negative left straight leg raise test.   Skin:     General: Skin is warm and dry.   Neurological:      General: No focal deficit present.      Mental Status: She is alert and oriented to person, place, and time.   Psychiatric:         Mood and Affect: Mood normal.         Behavior: Behavior normal.         Thought Content: Thought content normal.         Judgment: Judgment normal.         ED Course         Medications   ketorolac (TORADOL) injection 15 mg (15 mg Intramuscular Given 8/6/22 2451)       Assessments &  Plan (with Medical Decision Making)   27-year-old female presents for medication refills of her chronic back pain.  She is run out of her for muscle relaxer and her methocarbamol and would like a refill until 6 she is able to see her primary care provider August 30.    Assessment exam reveals tenderness to the low back and pain and tenderness over the SI joint of the left.  Straight legs raises are negative bilaterally.    HPI, assessment exam, and symptoms is not consistent with infection as she has no fevers.  Cauda equina as she has no loss of bowels or bladder.  She has no numbness or tingling.  And she does not have extremity weakness.  Aortic dissection as she does not appear to be in that acute distress, pain, and pain is improved with rest.     Discharge plan discussed with patient that I would go ahead and refill her medications.  She will follow-up with her primary care provider as scheduled.  I have instructed her if her symptoms worsen or she develops any new concerning symptoms to return to the emergency room.      I have reviewed the nursing notes.    I have reviewed the findings, diagnosis, plan and need for follow up with the patient.      Discharge Medication List as of 8/6/2022  1:51 PM      START taking these medications    Details   cyclobenzaprine (FLEXERIL) 10 MG tablet Take 1 tablet (10 mg) by mouth 3 times daily as needed for muscle spasms, Disp-30 tablet, R-0, E-Prescribe             Final diagnoses:   Chronic left-sided low back pain with left-sided sciatica       8/6/2022   [facility]     Fara Rosales APRN CNP  08/18/22 2302       Fara Rosales APRN CNP  08/18/22 2303       Fara Rosales APRN CNP  08/18/22 2303

## 2022-08-06 NOTE — DISCHARGE INSTRUCTIONS
Thank you for choosing [facility] for your healthcare needs today.  For your back pain take your medications as prescribed, continue with your physical therapy at home, and follow-up with your primary care provider as scheduled for August 30.  Thank you

## 2022-08-14 ENCOUNTER — APPOINTMENT (OUTPATIENT)
Dept: CT IMAGING | Facility: HOSPITAL | Age: 27
End: 2022-08-14
Attending: NURSE PRACTITIONER
Payer: COMMERCIAL

## 2022-08-14 ENCOUNTER — HOSPITAL ENCOUNTER (EMERGENCY)
Facility: HOSPITAL | Age: 27
Discharge: HOME | End: 2022-08-14
Attending: NURSE PRACTITIONER | Admitting: NURSE PRACTITIONER
Payer: COMMERCIAL

## 2022-08-14 VITALS
WEIGHT: 250 LBS | OXYGEN SATURATION: 100 % | TEMPERATURE: 97.8 F | DIASTOLIC BLOOD PRESSURE: 52 MMHG | HEART RATE: 81 BPM | RESPIRATION RATE: 16 BRPM | SYSTOLIC BLOOD PRESSURE: 85 MMHG | BODY MASS INDEX: 38.01 KG/M2

## 2022-08-14 DIAGNOSIS — M54.16 LUMBAR RADICULOPATHY: Primary | ICD-10-CM

## 2022-08-14 PROCEDURE — 250N000011 HC RX IP 250 OP 636: Performed by: NURSE PRACTITIONER

## 2022-08-14 PROCEDURE — 99214 OFFICE O/P EST MOD 30 MIN: CPT | Performed by: NURSE PRACTITIONER

## 2022-08-14 PROCEDURE — 72131 CT LUMBAR SPINE W/O DYE: CPT

## 2022-08-14 PROCEDURE — G0463 HOSPITAL OUTPT CLINIC VISIT: HCPCS | Mod: 25

## 2022-08-14 PROCEDURE — 250N000013 HC RX MED GY IP 250 OP 250 PS 637: Performed by: NURSE PRACTITIONER

## 2022-08-14 PROCEDURE — 96372 THER/PROPH/DIAG INJ SC/IM: CPT | Performed by: NURSE PRACTITIONER

## 2022-08-14 RX ORDER — DIAZEPAM 5 MG
10 TABLET ORAL ONCE
Status: COMPLETED | OUTPATIENT
Start: 2022-08-14 | End: 2022-08-14

## 2022-08-14 RX ORDER — OXYCODONE HYDROCHLORIDE 5 MG/1
5 TABLET ORAL EVERY 6 HOURS PRN
Qty: 12 TABLET | Refills: 0 | Status: SHIPPED | OUTPATIENT
Start: 2022-08-14 | End: 2022-08-17

## 2022-08-14 RX ORDER — HYDROMORPHONE HYDROCHLORIDE 1 MG/ML
0.5 INJECTION, SOLUTION INTRAMUSCULAR; INTRAVENOUS; SUBCUTANEOUS ONCE
Status: COMPLETED | OUTPATIENT
Start: 2022-08-14 | End: 2022-08-14

## 2022-08-14 RX ORDER — DEXAMETHASONE SODIUM PHOSPHATE 10 MG/ML
10 INJECTION, SOLUTION INTRAMUSCULAR; INTRAVENOUS ONCE
Status: COMPLETED | OUTPATIENT
Start: 2022-08-14 | End: 2022-08-14

## 2022-08-14 RX ADMIN — DEXAMETHASONE SODIUM PHOSPHATE 10 MG: 10 INJECTION, SOLUTION INTRAMUSCULAR; INTRAVENOUS at 11:00

## 2022-08-14 RX ADMIN — DIAZEPAM 10 MG: 5 TABLET ORAL at 11:00

## 2022-08-14 RX ADMIN — HYDROMORPHONE HYDROCHLORIDE 0.5 MG: 1 INJECTION, SOLUTION INTRAMUSCULAR; INTRAVENOUS; SUBCUTANEOUS at 11:37

## 2022-08-14 ASSESSMENT — ENCOUNTER SYMPTOMS
FEVER: 0
FREQUENCY: 0
ABDOMINAL PAIN: 0
DYSURIA: 0
SHORTNESS OF BREATH: 0
VOMITING: 0
CHILLS: 0
NAUSEA: 0

## 2022-08-14 ASSESSMENT — ACTIVITIES OF DAILY LIVING (ADL): ADLS_ACUITY_SCORE: 37

## 2022-08-14 NOTE — ED TRIAGE NOTES
Low back pain for about a month worse over last week left side radiation to hip but and leg and down to foot. Numbness in foot.

## 2022-08-14 NOTE — DISCHARGE INSTRUCTIONS
Continue taking muscle relaxants as prescribed.  I did prescribe some stronger pain medication for severe pain as needed.    Continue doing your stretches, applying ice packs alternating with heat.    Schedule an appointment with your doctor soon as possible for further evaluation.    Return to the emergency department for worsening or concerning symptoms.

## 2022-08-14 NOTE — Clinical Note
Johanny Infante was seen and treated in our emergency department on 8/14/2022.  She may return to work on 08/17/2022.       If you have any questions or concerns, please don't hesitate to call.      Mpofu, Prudence, CNP

## 2022-08-14 NOTE — ED PROVIDER NOTES
History     Chief Complaint   Patient presents with     Back Pain     HPI  Johanny Infante is a 27 year old female with a history of sciatica pain at that presents with her significant other for evaluation of low back pain.  The patient reports right-sided low back pain radiating down his leg along with numbness to the right leg and foot.  Symptoms started a month ago.  Patient has been seen twice in this department for similar symptoms.  In the last few days her symptoms have significantly worsened.  She has been taking tizanidine and Flexeril as prescribed.  She has also been taking ibuprofen and doing her stretches for sciatica pain.  She tells me that nothing is working at this time.  Her symptoms are worsening.  She has never had back surgeries.  She denies having ever had images to her back.  No abdominal pain, urinary symptoms, fever or chills.  Denies saddle paresthesias, urinary retention, bowel or bladder incontinence.    She tells me that she took ibuprofen, Flexeril and tizanidine about an hour prior to this arrival.     She is scheduled to establish care with a new primary medical provider on 2022.    Allergies:  No Known Allergies    Problem List:    Patient Active Problem List    Diagnosis Date Noted     Endometritis following delivery--post , IV ATBX---2019     Priority: Medium     Endometritis 2019     Priority: Medium     Encounter for triage in pregnant patient 10/02/2017     Priority: Medium     Uncontrolled pain 2015     Priority: Medium     Tobacco abuse      Priority: Medium     Obesity      Priority: Medium        Past Medical History:    Past Medical History:   Diagnosis Date     Obesity      Tobacco abuse        Past Surgical History:    Past Surgical History:   Procedure Laterality Date     ENT SURGERY      tonsils and adnoids     wisdom teeth[         Family History:    Family History   Problem Relation Age of Onset     Diabetes Mother        Social  History:  Marital Status:  Single [1]  Social History     Tobacco Use     Smoking status: Current Every Day Smoker     Packs/day: 0.40     Smokeless tobacco: Never Used   Substance Use Topics     Alcohol use: No     Drug use: No        Medications:    cyclobenzaprine (FLEXERIL) 10 MG tablet  oxyCODONE (ROXICODONE) 5 MG tablet  methocarbamol (ROBAXIN) 500 MG tablet          Review of Systems   Constitutional: Negative for chills and fever.   Respiratory: Negative for shortness of breath.    Cardiovascular: Negative for chest pain.   Gastrointestinal: Negative for abdominal pain, nausea and vomiting.   Genitourinary: Negative for dysuria and frequency.       Physical Exam   BP: 128/81  Pulse: 103  Temp: 97.8  F (36.6  C)  Resp: 20  Weight: 113.4 kg (250 lb)  SpO2: 99 %      Physical Exam  Vitals and nursing note reviewed.   Constitutional:       General: She is in acute distress.      Appearance: Normal appearance. She is not ill-appearing or toxic-appearing.      Comments: Patient found lying supine on the floor noting that this feels better than sitting up.  Tearful.   HENT:      Head: Atraumatic.   Eyes:      Pupils: Pupils are equal, round, and reactive to light.   Cardiovascular:      Rate and Rhythm: Normal rate and regular rhythm.      Heart sounds: Normal heart sounds.   Pulmonary:      Effort: Pulmonary effort is normal. No respiratory distress.      Breath sounds: Normal breath sounds.   Abdominal:      General: Bowel sounds are normal.      Palpations: Abdomen is soft.      Tenderness: There is no abdominal tenderness. There is no right CVA tenderness, left CVA tenderness, guarding or rebound.   Musculoskeletal:      Cervical back: Normal, normal range of motion and neck supple. No swelling, edema or tenderness. No pain with movement.      Thoracic back: Normal.      Lumbar back: Tenderness present. No deformity or bony tenderness. Decreased range of motion. Negative right straight leg raise test and  negative left straight leg raise test.   Skin:     General: Skin is warm and dry.      Capillary Refill: Capillary refill takes less than 2 seconds.   Neurological:      Mental Status: She is alert and oriented to person, place, and time.         ED Course                 Procedures              Results for orders placed or performed during the hospital encounter of 08/14/22 (from the past 24 hour(s))   Lumbar spine CT w/o contrast    Narrative    Exam: CT LUMBAR SPINE W/O CONTRAST    Exam reason: right lower back pain with numbness to right leg and  foot, symptoms x 1 month and progressively worsened    Technique: Using helical CT technique, axial images were obtained  through the lumbar spine. Coronal and sagittal reconstructions also  performed. No contrast used.    Comparison: 6/3/2019    Findings:    There is normal alignment of the lumbar spine.    No acute fracture.    There is mild disc space narrowing at L4-L5.    There is a mild asymmetric to the left disc bulge at L4-L5 with mild  left neural foraminal narrowing.    There is asymmetric to the left disc bulge at L5-S1 with left neural  foraminal narrowing.    Mild facet arthropathy at L4-L5 and L5-S1.    Paraspinous/retroperitoneal regions: Visualized portions unremarkable.      Impression    Impression:    No acute fracture or subluxation.     There are mild degenerative changes of the lower lumbar spine.    MIRTHA ZAFAR MD         SYSTEM ID:  [id number]       Medications   diazepam (VALIUM) tablet 10 mg (10 mg Oral Given 8/14/22 1100)   dexamethasone PF (DECADRON) injection 10 mg (10 mg Intramuscular Given 8/14/22 1100)   HYDROmorphone (PF) (DILAUDID) injection 0.5 mg (0.5 mg Intramuscular Given 8/14/22 1137)       Assessments & Plan (with Medical Decision Making)     I have reviewed the nursing notes.    A pleasant 27-year-old female for evaluation of right-sided low back pain radiating down her right leg along with numbness to the right leg and  foot.  Symptoms have been persisting for over a month and progressively worsening.  Seen multiple times for similar complaint.  Fortunately she denies any urinary retention, bowel or bladder incontinence or saddle paresthesias.  CT lumbar spine was completed today with no acute findings.  Patient was treated with Valium, Decadron and Dilaudid with improvement in her pain.  She was able to ambulate out of this department.    Discussed all findings with patient.  She does have a history of sciatica never had surgeries to her back.  Recommended that she continue taking muscle relaxant that she has at home as prescribed.  Tylenol or ibuprofen as needed for pain.  Limited prescription of Roxicodone as prescribed for severe pain.  Patient plans on establishing care with a new primary medical provider on 8/22/2022.  She was encouraged to keep this up appointment for reevaluation.  Strict return precautions discussed with patient and significant other who voiced understanding.    I have reviewed the findings, diagnosis, plan and need for follow up with the patient.  This document was prepared using a combination of typing and voice generated software.  While every attempt was made for accuracy, spelling and grammatical errors may exist.    Discharge Medication List as of 8/14/2022 12:08 PM      START taking these medications    Details   oxyCODONE (ROXICODONE) 5 MG tablet Take 1 tablet (5 mg) by mouth every 6 hours as needed for pain, Disp-12 tablet, R-0, E-Prescribe             Final diagnoses:   Lumbar radiculopathy       8/14/2022   [facility]     Mpofu, Prudence, CNP  08/14/22 0776

## 2022-08-18 ASSESSMENT — ENCOUNTER SYMPTOMS
DIZZINESS: 1
CONSTITUTIONAL NEGATIVE: 1
EYES NEGATIVE: 1
HEMATOLOGIC/LYMPHATIC NEGATIVE: 1
ALLERGIC/IMMUNOLOGIC NEGATIVE: 1
BACK PAIN: 1
ENDOCRINE NEGATIVE: 1
PSYCHIATRIC NEGATIVE: 1
GASTROINTESTINAL NEGATIVE: 1
RESPIRATORY NEGATIVE: 1
CARDIOVASCULAR NEGATIVE: 1

## 2022-08-19 ENCOUNTER — HOSPITAL ENCOUNTER (EMERGENCY)
Facility: HOSPITAL | Age: 27
Discharge: HOME | End: 2022-08-19
Attending: EMERGENCY MEDICINE | Admitting: EMERGENCY MEDICINE
Payer: COMMERCIAL

## 2022-08-19 ENCOUNTER — APPOINTMENT (OUTPATIENT)
Dept: MRI IMAGING | Facility: HOSPITAL | Age: 27
End: 2022-08-19
Attending: EMERGENCY MEDICINE
Payer: COMMERCIAL

## 2022-08-19 VITALS
RESPIRATION RATE: 16 BRPM | HEART RATE: 86 BPM | SYSTOLIC BLOOD PRESSURE: 96 MMHG | DIASTOLIC BLOOD PRESSURE: 54 MMHG | TEMPERATURE: 98 F | OXYGEN SATURATION: 96 %

## 2022-08-19 DIAGNOSIS — M54.42 LEFT-SIDED LOW BACK PAIN WITH LEFT-SIDED SCIATICA, UNSPECIFIED CHRONICITY: Primary | ICD-10-CM

## 2022-08-19 PROCEDURE — 99285 EMERGENCY DEPT VISIT HI MDM: CPT | Mod: 25

## 2022-08-19 PROCEDURE — 96376 TX/PRO/DX INJ SAME DRUG ADON: CPT

## 2022-08-19 PROCEDURE — 72148 MRI LUMBAR SPINE W/O DYE: CPT

## 2022-08-19 PROCEDURE — 96375 TX/PRO/DX INJ NEW DRUG ADDON: CPT

## 2022-08-19 PROCEDURE — 96374 THER/PROPH/DIAG INJ IV PUSH: CPT

## 2022-08-19 PROCEDURE — 250N000011 HC RX IP 250 OP 636: Performed by: EMERGENCY MEDICINE

## 2022-08-19 PROCEDURE — 99284 EMERGENCY DEPT VISIT MOD MDM: CPT | Performed by: EMERGENCY MEDICINE

## 2022-08-19 RX ORDER — FENTANYL CITRATE 50 UG/ML
50 INJECTION, SOLUTION INTRAMUSCULAR; INTRAVENOUS ONCE
Status: COMPLETED | OUTPATIENT
Start: 2022-08-19 | End: 2022-08-19

## 2022-08-19 RX ORDER — KETOROLAC TROMETHAMINE 15 MG/ML
15 INJECTION, SOLUTION INTRAMUSCULAR; INTRAVENOUS ONCE
Status: COMPLETED | OUTPATIENT
Start: 2022-08-19 | End: 2022-08-19

## 2022-08-19 RX ORDER — GABAPENTIN 100 MG/1
CAPSULE ORAL
COMMUNITY
Start: 2022-03-17

## 2022-08-19 RX ORDER — ARIPIPRAZOLE 10 MG/1
TABLET ORAL
COMMUNITY
Start: 2022-08-18 | End: 2023-04-01

## 2022-08-19 RX ORDER — METHYLPREDNISOLONE 4 MG
4 TABLET, DOSE PACK ORAL 2 TIMES DAILY
Qty: 21 TABLET | Refills: 0 | Status: SHIPPED | OUTPATIENT
Start: 2022-08-19 | End: 2023-04-01

## 2022-08-19 RX ORDER — DIAZEPAM 5 MG
TABLET ORAL
COMMUNITY
Start: 2022-08-18 | End: 2023-04-01

## 2022-08-19 RX ORDER — HYDROCODONE BITARTRATE AND ACETAMINOPHEN 5; 325 MG/1; MG/1
1 TABLET ORAL EVERY 6 HOURS PRN
Qty: 10 TABLET | Refills: 0 | Status: SHIPPED | OUTPATIENT
Start: 2022-08-19 | End: 2023-04-01

## 2022-08-19 RX ORDER — ESCITALOPRAM OXALATE 20 MG/1
20 TABLET ORAL
COMMUNITY

## 2022-08-19 RX ADMIN — KETOROLAC TROMETHAMINE 15 MG: 15 INJECTION, SOLUTION INTRAMUSCULAR; INTRAVENOUS at 10:17

## 2022-08-19 RX ADMIN — FENTANYL CITRATE 50 MCG: 50 INJECTION, SOLUTION INTRAMUSCULAR; INTRAVENOUS at 10:18

## 2022-08-19 RX ADMIN — HYDROMORPHONE HYDROCHLORIDE 1 MG: 1 INJECTION, SOLUTION INTRAMUSCULAR; INTRAVENOUS; SUBCUTANEOUS at 11:21

## 2022-08-19 RX ADMIN — FENTANYL CITRATE 50 MCG: 50 INJECTION, SOLUTION INTRAMUSCULAR; INTRAVENOUS at 12:31

## 2022-08-19 ASSESSMENT — ENCOUNTER SYMPTOMS
BACK PAIN: 1
RESPIRATORY NEGATIVE: 1
EYES NEGATIVE: 1
NEUROLOGICAL NEGATIVE: 1
GASTROINTESTINAL NEGATIVE: 1
CONSTITUTIONAL NEGATIVE: 1
CARDIOVASCULAR NEGATIVE: 1

## 2022-08-19 ASSESSMENT — ACTIVITIES OF DAILY LIVING (ADL): ADLS_ACUITY_SCORE: 38

## 2022-08-19 NOTE — ED NOTES
Patient has taken no meds this am for pain. Pain 10/10. Denies any injury. Has been at least a month

## 2022-08-19 NOTE — ED TRIAGE NOTES
Arrived to triage via wheelchair very tearful c/o back pain and sciatic pain rated 10/10. States she can't get into her PCP until the 22nd and the nurse at the clinic told her to just keep coming here for pain control until she is seen in clinic. Patient reports she has been prescribed multiple pain meds and muscle relaxers but nothing is helping. Denies taking any pain medication today.

## 2022-08-19 NOTE — ED PROVIDER NOTES
History     Chief Complaint   Patient presents with     Back Pain     HPI  Johanny Infante is a 27 year old female who who comes to the emergency department complaining of severe low back pain that radiates into her left lower extremity.  Patient has been having severe back pain for the past several weeks.  She has been taking pain medication and muscle relaxers but has not really been getting much in the way of relief.  She took her medications last night.  When she woke up this morning her pain was significantly increased.  She has been unable to get into see her primary provider.  She denies change in bowel or bladder habits and denies saddle anesthesia.  She has had no new recent injury.  She has not had hematuria or dysuria.  She has not had nausea or vomiting.    Allergies:  No Known Allergies    Problem List:    Patient Active Problem List    Diagnosis Date Noted     Endometritis following delivery--post , IV ATBX---2019     Priority: Medium     Endometritis 2019     Priority: Medium     Encounter for triage in pregnant patient 10/02/2017     Priority: Medium     Uncontrolled pain 2015     Priority: Medium     Tobacco abuse      Priority: Medium     Obesity      Priority: Medium        Past Medical History:    Past Medical History:   Diagnosis Date     Obesity      Tobacco abuse        Past Surgical History:    Past Surgical History:   Procedure Laterality Date     ENT SURGERY      tonsils and adnoids     wisdom teeth[         Family History:    Family History   Problem Relation Age of Onset     Diabetes Mother        Social History:  Marital Status:  Single [1]  Social History     Tobacco Use     Smoking status: Current Every Day Smoker     Packs/day: 0.40     Smokeless tobacco: Never Used   Substance Use Topics     Alcohol use: No     Drug use: No        Medications:    HYDROcodone-acetaminophen (NORCO) 5-325 MG tablet  methocarbamol (ROBAXIN) 500 MG  tablet  methylPREDNISolone (MEDROL DOSEPAK) 4 MG tablet therapy pack  ARIPiprazole (ABILIFY) 10 MG tablet  cyclobenzaprine (FLEXERIL) 10 MG tablet  diazepam (VALIUM) 5 MG tablet  escitalopram (LEXAPRO) 20 MG tablet  gabapentin (NEURONTIN) 100 MG capsule          Review of Systems   Constitutional: Negative.    HENT: Negative.    Eyes: Negative.    Respiratory: Negative.    Cardiovascular: Negative.    Gastrointestinal: Negative.    Genitourinary: Negative.    Musculoskeletal: Positive for back pain.   Neurological: Negative.    All other systems reviewed and they are found unremarkable    Physical Exam   BP: 115/78  Pulse: 110  Temp: 97.6  F (36.4  C)  Resp: 20  SpO2: 99 %      Physical Exam 27-year-old female who is awake alert.  She is tearful.  She is lying on her right side and position of comfort with her thighs flexed.  HET normocephalic extraocular muscles intact pupils equally round and reactive light and oropharynx is clear.  Neck is supple his range of motion without pain.  Lungs clear bilaterally.  Heart maintains regular rate and rhythm S1 and S2 sounds appreciated.  Abdomen is soft and nontender no rebound or involuntary guarding.  Extremity exam patient has increased discomfort in her back with attempted straight leg raising.  She has 5/5 strength in her extremities.  No sensory deficit to light touch.  Brisk capillary refill is noted.  No edema is noted.  Neurologic exam there is no focal cranial nerve deficit.  Musculoskeletal exam patient has mild to moderate lumbar paraspinous muscle spasm and trigger point tenderness in the lumbar musculature.  No palpable midline bony tenderness or abnormality is appreciated.  Range of motion is restricted by pain    ED Course              ED Course as of 08/19/22 1233   Fri Aug 19, 2022   1226 The patient remained stable throughout her stay in the department.  She did begin to get some pain relief.  I discussed MRI results with her.  I will refer her for  outpatient physical therapy evaluation.  [facility].  I will advise the patient also to follow-up with her primary care provider as soon as possible.                         Results for orders placed or performed during the hospital encounter of 08/19/22 (from the past 24 hour(s))   Lumbar spine MRI w/o contrast    Narrative    PROCEDURE: MR LUMBAR SPINE W/O CONTRAST, 8/19/2022 12:06 PM     HISTORY:Female, age 27 years, severe low back pain with left sided  radiculopathy    COMPARISON: CT scan lumbar spine 8/14/2022    TECHNIQUE: Sagittal T1, proton density, T2 with fat saturation; axial  proton density and T2.    FINDINGS:  Lumbar spine demonstrates a normal lordotic curvature.    Small Schmorl's nodes are seen at L1-2, L2-3 and L3-4. Mild endplate  spurring is also seen at T11-12. The dorsal lateral and dorsal margins  of the disks at each of these levels are unremarkable.     L4-5: Mild disc desiccation. Dorsal annular tear. Small posterior  central, caudally directed disc herniation impinges upon the ventral  aspect of the thecal sac, mildly narrowing the central canal with  possible mass effect upon the exiting left L5 root sleeve. Facet  joints are unremarkable, as are the endplates.    L5-S1: There is a 10 mm x 15 mm left parasagittal disc herniation with  annular tearing at dorsally displaces and compresses the intrathecal  and exiting portions of the left S1 root sleeve. Minimal degenerative  changes are seen within the facet joints.    SI joints are congruent. There is no evidence of sacral fracture.  Right perineal soft tissues are grossly normal.      Impression    IMPRESSION:   10 mm x 15 mm left parasagittal L5-S1 disc herniation compresses the  left S1 nerve root in the left lateral recess. Correlate for left S1  radiculopathy.    Small left parasagittal disc protrusion at L4-5 appears to touch the  intrathecal and exiting left L5 nerve root in the  left lateral recess.    No evidence of an acute or healing fracture.    JANET KEANE MD         SYSTEM ID:  [id number]       Medications   fentaNYL (PF) (SUBLIMAZE) injection 50 mcg (50 mcg Intravenous Given 8/19/22 1018)   ketorolac (TORADOL) injection 15 mg (15 mg Intravenous Given 8/19/22 1017)   HYDROmorphone (DILAUDID) injection 1 mg (1 mg Intravenous Given 8/19/22 1121)   fentaNYL (PF) (SUBLIMAZE) injection 50 mcg (50 mcg Intravenous Given 8/19/22 1231)       Assessments & Plan (with Medical Decision Making)     I have reviewed the nursing notes.    I have reviewed the findings, diagnosis, plan and need for follow up with the patient.  The plan is to discharge the patient with appropriate prescriptions, discharge and follow up instructions.    New Prescriptions    HYDROCODONE-ACETAMINOPHEN (NORCO) 5-325 MG TABLET    Take 1 tablet by mouth every 6 hours as needed for severe pain    METHYLPREDNISOLONE (MEDROL DOSEPAK) 4 MG TABLET THERAPY PACK    Take 1 tablet (4 mg) by mouth 2 times daily Follow Package Directions       Final diagnoses:   Left-sided low back pain with left-sided sciatica, unspecified chronicity       8/19/2022   [facility]     Janet García,   08/19/22 0773

## 2022-08-24 ENCOUNTER — HOSPITAL ENCOUNTER (OUTPATIENT)
Dept: PHYSICAL THERAPY | Facility: HOSPITAL | Age: 27
Setting detail: THERAPIES SERIES
Discharge: HOME | End: 2022-08-24
Attending: EMERGENCY MEDICINE
Payer: COMMERCIAL

## 2022-08-24 DIAGNOSIS — M54.42 LEFT-SIDED LOW BACK PAIN WITH LEFT-SIDED SCIATICA, UNSPECIFIED CHRONICITY: ICD-10-CM

## 2022-08-24 PROCEDURE — 97110 THERAPEUTIC EXERCISES: CPT | Mod: GP

## 2022-08-24 PROCEDURE — 97161 PT EVAL LOW COMPLEX 20 MIN: CPT | Mod: GP

## 2022-08-25 NOTE — PROGRESS NOTES
08/24/22 1400   General Information   Type of Visit Initial OP Ortho PT Evaluation   Start of Care Date 08/24/22   Referring Physician Guicho García, DO   Patient/Family Goals Statement Pt. would like to eliminate her pain and return to her previous level of function.   Orders Evaluate and Treat   Date of Order 08/24/22   Certification Required? Yes   Medical Diagnosis Left sided low back pain with left sided sciatica, unspecified chronicity   Surgical/Medical history reviewed Yes   Precautions/Limitations no known precautions/limitations       Present No   Body Part(s)   Body Part(s) Lumbar Spine/SI   Presentation and Etiology   Pertinent history of current problem (include personal factors and/or comorbidities that impact the POC) Pt. 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 Initially, she believed she may have just slept funny, but the pain has not subsided since. Pt. denies loss of control bowel/bladder and saddle anesthesia.   Impairments A. Pain;B. Decreased WB tolerance;D. Decreased ROM;E. Decreased flexibility;F. Decreased strength and endurance;G. Impaired balance;H. Impaired gait;K. Numbness;L. Tingling;O. Blurred vision   Functional Limitations perform activities of daily living;perform required work activities;perform desired leisure / sports activities   Symptom Location Lumbar spine and A/P left LE.   How/Where did it occur From insidious onset   Onset date of current episode/exacerbation 08/10/22   Chronicity Other  (Pt. reports she felt an initial twinge a few months ago, but her pain didn't reach its current level until around two weeks ago.)   Pain rating (0-10 point scale) Best (/10);Worst (/10)   Best (/10) 7/10   Worst (/10) 10/10   Pain quality A. Sharp;C. Aching;B. Dull;D. Burning;E. Shooting;F. Stabbing   Frequency of pain/symptoms A. Constant   Pain/symptoms are: The same all the  time   Pain/symptoms exacerbated by A. Sitting;B. Walking;C. Lifting;D. Carrying   Pain/symptoms eased by E. Changing positions  (Prescription pain medications)   Progression of symptoms since onset: Unchanged   Current / Previous Interventions   Diagnostic Tests: MRI   Prior Level of Function   Prior Level of Function-Mobility independent   Prior Level of Function-ADL's independent   Current Level of Function   Current Community Support Family/friend caregiver   Patient role/employment history A. Employed  (Employed in a group home. Is currently unable to perform her job duties.)   Living environment [facility]   Fall Risk Screen   Fall screen completed by PT   Have you fallen 2 or more times in the past year? No   Have you fallen and had an injury in the past year? No   Timed Up and Go score (seconds) NT   Is patient a fall risk? No   Abuse Screen (yes response referral indicated)   Feels Unsafe at Home or Work/School no   Feels Threatened by Someone no   Does Anyone Try to Keep You From Having Contact with Others or Doing Things Outside Your Home? no   Physical Signs of Abuse Present no   Presenting problem Low back pain with sciatica   Patient needs abuse support services and resources No   Lumbar Spine/SI Objective Findings   Gait/Locomotion Pt. ambulating with decreased stride length and isaias.   Flexion ROM Finger tips to mid thigh with pain   Extension ROM Unable to leave neutral position due to pain   Right Side Bending ROM Fingertips to knee with pain   Left Side Bending ROM Fingertips to knee with pain   Repeated Extension-Standing ROM Not tested due to current pain level   Repeated Flexion-Standing ROM Not tested due to current pain level   Hip Screen Hip ROM restricted due to severity of low back and neurogenic pain   Babinski Reflexes NT   Hip Flexion (L2) Strength 3/5 pain limited   Hip Abduction Strength NT   Knee Extension (L3) Strength 3/5 pain limited   Ankle Dorsiflexion (L4) Strength 3/5  pain limited   Great Toe Extension (L5) Strength 3/5 pain limited   Ankle Plantar Flexion (S1) Strength 3/5 pain limited   Lumbar/Hip/Knee/Foot Strength Comments Myotomal strength decreased on left and pain limited. Other planes not assessed due to severity of current symptoms   SLR positive on left   Crossover SLR Negative   Segmental Mobility grade I-II lumbar CPA/UPA reproduced pt. chief complaint   Palpation Palpable tightness and tender in bilateral erector spinae group   Slump Test NT due to current symptom severity   Observation Pt. appears to be in considerable discomfort. Decreased velocity and amplitude of voluntary movement due to current level of pain. Repeated position changes required during evaluation to maintain comfort.   Posture Pt. presents with stiff posture and considerable guarding due to current levels of pain   Sensation Testing Diminished sensation to light touch on left in distribution of L3-S1   Patellar Tendon Reflexes  Right 1+ Left 1+   Achilles Tendon Reflexes Left 1+ Right 2+   Planned Therapy Interventions   Planned Therapy Interventions joint mobilization;manual therapy;motor coordination training;neuromuscular re-education;ROM;strengthening;stretching   Planned Modality Interventions   Planned Modality Interventions Electrical stimulation;Cryotherapy;Hot packs;Iontophoresis;TENS;Traction;Ultrasound   Clinical Impression   Criteria for Skilled Therapeutic Interventions Met yes, treatment indicated   PT Diagnosis Pt. presents to therapy with 10/10 pain in lumbar spine and left lower extremity. Findings are consistent with lumbar radiculopathy due to disc herniation   Influenced by the following impairments 10/10 pain, decreased lumbar range of motion, diminished sensation and strength of left LE, and decreased activity and functional tolerance.   Functional limitations due to impairments Pt. is unable to perform ADL's without pain and increased time and is currently not working due  to her condition.   Clinical Presentation Stable/Uncomplicated   Clinical Presentation Rationale Therapist discretion   Clinical Decision Making (Complexity) Low complexity   Therapy Frequency 2 times/Week   Predicted Duration of Therapy Intervention (days/wks) 8 weeks   Risk & Benefits of therapy have been explained Yes   Patient, Family & other staff in agreement with plan of care Yes   Education Assessment   Preferred Learning Style Listening;Reading;Demonstration;Pictures/video   Barriers to Learning No barriers   ORTHO GOALS   PT Ortho Eval Goals 1;2;3;4   Ortho Goal 1   Goal Identifier LTG 1   Goal Description Pt. to be independent in [facility] for management of her current condition to begin transition to home management.   Target Date 10/20/22   Ortho Goal 2   Goal Identifier LTG 2   Goal Description Pt. to report decrease in low back and extremity pain to a 2/10 or better with activity to improve her ability to perform current work and home duties.   Target Date 10/20/22   Ortho Goal 3   Goal Identifier LTG 3   Goal Description Pt. to improve LE MMT grades to 4/5 or better without pain to improve her ability to perform her current work and home duties.   Goal Progress 10/20/2022   Ortho Goal 4   Goal Identifier STG 1   Goal Description Pt. to report reduction in her current level of pain to 5/10 or better with activity to improve her tolerance for functional and work activities and facilitate an increase in the vigor of therapy treatment.   Target Date 09/22/22   Total Evaluation Time   PT Eval, Low Complexity Minutes (22568) 45   Therapy Certification   Certification date from 08/24/22   Certification date to 10/20/22   Medical Diagnosis Left sided low back pain with left sided sciatica, unspecified chronicity   I certify the need for these services furnished under this plan of treatment and while under my care. (Physician co-signature of this document indicates review and certification of the therapy plan).

## 2022-08-26 ENCOUNTER — HOSPITAL ENCOUNTER (OUTPATIENT)
Dept: PHYSICAL THERAPY | Facility: HOSPITAL | Age: 27
Setting detail: THERAPIES SERIES
Discharge: HOME | End: 2022-08-26
Attending: EMERGENCY MEDICINE
Payer: COMMERCIAL

## 2022-08-26 PROCEDURE — 97140 MANUAL THERAPY 1/> REGIONS: CPT | Mod: GP

## 2022-09-14 ENCOUNTER — OFFICE VISIT (OUTPATIENT)
Dept: ORTHOPEDICS | Facility: OTHER | Age: 27
End: 2022-09-14
Attending: SPECIALIST
Payer: COMMERCIAL

## 2022-09-14 VITALS
DIASTOLIC BLOOD PRESSURE: 62 MMHG | HEART RATE: 99 BPM | SYSTOLIC BLOOD PRESSURE: 128 MMHG | OXYGEN SATURATION: 99 % | BODY MASS INDEX: 40.92 KG/M2 | WEIGHT: 270 LBS | TEMPERATURE: 98.4 F | HEIGHT: 68 IN

## 2022-09-14 DIAGNOSIS — M54.42 LEFT-SIDED LOW BACK PAIN WITH LEFT-SIDED SCIATICA, UNSPECIFIED CHRONICITY: ICD-10-CM

## 2022-09-14 PROCEDURE — 99203 OFFICE O/P NEW LOW 30 MIN: CPT | Performed by: SPECIALIST

## 2022-09-14 PROCEDURE — G0463 HOSPITAL OUTPT CLINIC VISIT: HCPCS

## 2022-09-14 ASSESSMENT — PAIN SCALES - GENERAL: PAINLEVEL: WORST PAIN (10)

## 2022-09-14 NOTE — NURSING NOTE
"Chief Complaint   Patient presents with     Musculoskeletal Problem     Low back pain        Initial /62 (BP Location: Right arm, Patient Position: Sitting, Cuff Size: Adult Large)   Pulse 99   Temp 98.4  F (36.9  C) (Tympanic)   Ht 1.727 m (5' 8\")   Wt 122.5 kg (270 lb)   SpO2 99%   BMI 41.05 kg/m   Estimated body mass index is 41.05 kg/m  as calculated from the following:    Height as of this encounter: 1.727 m (5' 8\").    Weight as of this encounter: 122.5 kg (270 lb).  Medication Reconciliation: complete  Carmen Infante LPN    "

## 2022-09-14 NOTE — PROGRESS NOTES
Service Date: 09/14/2022    The patient is a 27-year-old female seen today for evaluation of left low back pain and radiating leg pain.  She has had this since about June.  She does not recall any specific trauma, injury, overuse or activity associated with the onset of the symptoms. She has been in the ER at least once for evaluation.  She complains of pain that radiates down her left leg and into her foot.  She had complained of some numbness in her foot as well and numbness in her legs. Does not recall any lifting or trauma or injury that is associated with the onset of her symptoms.  She was seen and evaluated by her primary care provider.  Had a CT of her lumbar spine and  x-rays of her lumbar spine were done, which were fairly negative showing some mild degenerative changes, but an MRI of the lumbar spine showed a left-sided L5 to S1 disk protrusion causing some compression on the S1 nerve root.  She was referred to orthopedics today.  She is complaining of 10/10 pain.  She is taking ibuprofen and Tylenol and it does not seem to help a lot.  She is here today for evaluation and treatment.  Today on exam, the patient is awake, alert, oriented, no acute distress.  Overall, general mood, affect and appearance are normal.  Vital signs:  Weight 270, height 5 feet 8 inches, blood pressure 128/62, pulse 98, temperature 98.4 of. On evaluation of the lower extremities bilaterally, she has decreased ankle reflex on the left, has good patellar reflex, straight leg raise test supine and sitting are positive on the left.  She has decreased light touch sensation on the lateral side of her foot and leg.  She has good strength with plantarflexion, dorsiflexion, inversion and eversion of her foot.  She has good strength with her EHL and toe flexion.  She has some tenderness over the lower lumbar paraspinal region.  No hip pain or knee pain with range of motion.  The MRI of the lumbar spine was reviewed and I agree with  radiology interpretation.  It looks like she does have a significant disk protrusion that is causing some impingement on the left-sided lower nerve roots.  I recommended a referral to Dr. Chi.  We will try to get her in as soon as we can to see Dr. Chi for an evaluation.  In the meantime, I did prescribe tramadol.  I sent this electronically to her pharmacy in [place].  She will pick this up. She will try other conservative treatment like we discussed.  I wrote her an off work slip to be off work until further notice.  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  We will see how quickly we can get her in to see Dr. Chi.  I sent the prescription through electronically.  She should be able to pickup the tramadol prescription today.    Noel Lyles MD        D: 2022   T: 2022   MT: [id number]    Name:     MERLE PUENTES  MRN:      [id number]        Account:      [id number]   :      1995           Service Date: 2022       Document: [id number]

## 2022-10-31 NOTE — PROGRESS NOTES
[facility] Rehabilitation Service    Outpatient Physical Therapy Discharge Note  Patient: Johanny Infante  : 1995    Beginning/End Dates of Reporting Period:  22 to 22    Referring Provider: DO Kayleen Dickson Diagnosis: Pt. presents to therapy with 10/10 pain in lumbar spine and left lower extremity. Findings are consistent with lumbar radiculopathy due to disc herniation     Client Self Report: Pt. reports continued severe pain in her lower back and left LE today. Notes that she has not been sleeping at night due to the pain and is unable to find a postion of comfort. Continues to deny loss of bowel/bladder control or saddle anesthesia.    Objective Measurements:  Objective Measure: Lumbar ROM  Details: Lumbar ROM past neutral in all planes increased pain and neurologic symptoms in low back and LE       Goals:  Goal Identifier (P) LTG 1   Goal Description (P) Pt. to be independent in [facility] for management of her current condition to begin transition to home management.   Target Date (P) 10/20/22   Date Met      Progress (detail required for progress note): (P) Not Met:Pt. Referred for further evaluation on 22 and never returned to therapy following.      Goal Identifier (P) LTG 2   Goal Description (P) Pt. to report decrease in low back and extremity pain to a 2/10 or better with activity to improve her ability to perform current work and home duties.   Target Date (P) 10/20/22   Date Met      Progress (detail required for progress note): (P) Not met:Pt. Referred for further evaluation on 22 and never returned to therapy following.      Goal Identifier (P) LTG 3   Goal Description (P) Pt. to improve LE MMT grades to 4/5 or better without pain to improve her ability to perform her current work and home duties.   Target Date (P) 10/20/22   Date Met      Progress (detail required for progress note):  (P) Not Met: Pt. Referred for further evaluation on 8/26/22 and never returned to therapy following.      Goal Identifier (P) STG 1   Goal Description (P) Pt. to report reduction in her current level of pain to 5/10 or better with activity to improve her tolerance for functional and work activities and facilitate an increase in the vigor of therapy treatment.   Target Date (P) 09/22/22   Date Met      Progress (detail required for progress note): (P) Not Met: Pt. Referred for further evaluation on 8/26/22 and never returned to therapy following.          Plan:  Discharge from therapy. Pt. Was referred to urgent care on 8/26/22 and never returned to outpatient therapy thereafter.     Discharge:    Reason for Discharge: Patient chooses to discontinue therapy.    Equipment Issued: None    Discharge Plan: Unclear as pt. Never returned following session on 8/26/22

## 2022-11-19 ENCOUNTER — HEALTH MAINTENANCE LETTER (OUTPATIENT)
Age: 27
End: 2022-11-19

## 2023-04-01 ENCOUNTER — HOSPITAL ENCOUNTER (EMERGENCY)
Facility: HOSPITAL | Age: 28
Discharge: HOME | End: 2023-04-01
Attending: PHYSICIAN ASSISTANT | Admitting: PHYSICIAN ASSISTANT
Payer: COMMERCIAL

## 2023-04-01 VITALS
OXYGEN SATURATION: 96 % | HEART RATE: 85 BPM | RESPIRATION RATE: 16 BRPM | DIASTOLIC BLOOD PRESSURE: 83 MMHG | SYSTOLIC BLOOD PRESSURE: 146 MMHG | WEIGHT: 293 LBS | TEMPERATURE: 98 F | BODY MASS INDEX: 54.64 KG/M2

## 2023-04-01 DIAGNOSIS — S39.012A STRAIN OF LUMBAR REGION, INITIAL ENCOUNTER: ICD-10-CM

## 2023-04-01 PROCEDURE — G0463 HOSPITAL OUTPT CLINIC VISIT: HCPCS

## 2023-04-01 PROCEDURE — 99213 OFFICE O/P EST LOW 20 MIN: CPT | Performed by: PHYSICIAN ASSISTANT

## 2023-04-01 RX ORDER — PREDNISONE 20 MG/1
TABLET ORAL
Qty: 10 TABLET | Refills: 0 | Status: SHIPPED | OUTPATIENT
Start: 2023-04-01

## 2023-04-01 RX ORDER — HYDROCODONE BITARTRATE AND ACETAMINOPHEN 5; 325 MG/1; MG/1
1 TABLET ORAL EVERY 6 HOURS PRN
Qty: 6 TABLET | Refills: 0 | Status: SHIPPED | OUTPATIENT
Start: 2023-04-01

## 2023-04-01 ASSESSMENT — ACTIVITIES OF DAILY LIVING (ADL): ADLS_ACUITY_SCORE: 37

## 2023-04-01 NOTE — ED TRIAGE NOTES
Patient presents to urgent care with delores for low back pain.  Patient reports she aggrevated her back by attempting to  a shampoo bottle in the shower this morning. She had back surgery approximately 4 months ago. Patient reports she did take a methocarbamol this morning without any relief

## 2023-04-01 NOTE — ED TRIAGE NOTES
28 y/o female presents with reports of lower back pain. She denies injury; she reports the pain started when she attempted to  a shampoo bottle in the shower this morning. She has a hx of back surgery approximately 4 months ago. She did take a methocarbamol with no relief.

## 2023-04-01 NOTE — DISCHARGE INSTRUCTIONS
As discussed you can use the hydrocodone like once in the afternoon and once at night for couple days or you can just use this only at night.  I would encourage you to take some ibuprofen.  Up your Robaxin to 1000 mg for dosing.  I have prescribed some prednisone as well.  Return to the urgent care or ER if you feel that your symptoms are worsening like you to follow-up with your doctor next week unless you have resolution of your symptoms.

## 2023-04-01 NOTE — ED PROVIDER NOTES
History     Chief Complaint   Patient presents with     Back Pain     HPI  Johanny Infante is a 27 year old female who presents for low back pain.  She was bending over to  a shampoo bottle this morning and felt a twinge while standing up.  She had l4-s1 microdiskectomy 4 months ago.  Has not had significant issues related to her back since surgery.  No numbness or tingling.   Feels like her left leg is weaker than normal.  No loss of bowel/bladder function  No urinary retention  Does smoke  No IV drug use.  No heavy lifting.      Allergies:  No Known Allergies    Problem List:    Patient Active Problem List    Diagnosis Date Noted     Endometritis following delivery--post , IV ATBX---2019     Priority: Medium     Endometritis 2019     Priority: Medium     Encounter for triage in pregnant patient 10/02/2017     Priority: Medium     Uncontrolled pain 2015     Priority: Medium     Tobacco abuse      Priority: Medium     Obesity      Priority: Medium        Past Medical History:    Past Medical History:   Diagnosis Date     Obesity      Tobacco abuse        Past Surgical History:    Past Surgical History:   Procedure Laterality Date     ENT SURGERY      tonsils and adnoids     wisdom teeth[         Family History:    Family History   Problem Relation Age of Onset     Diabetes Mother        Social History:  Marital Status:  Single [1]  Social History     Tobacco Use     Smoking status: Every Day     Packs/day: 0.40     Types: Cigarettes     Smokeless tobacco: Never   Substance Use Topics     Alcohol use: No     Drug use: No        Medications:    escitalopram (LEXAPRO) 20 MG tablet  gabapentin (NEURONTIN) 100 MG capsule  HYDROcodone-acetaminophen (NORCO) 5-325 MG tablet  medical cannabis (Patient's own supply)  methocarbamol (ROBAXIN) 500 MG tablet  predniSONE (DELTASONE) 20 MG tablet          Review of Systems   All other systems reviewed and are negative.      Physical Exam    BP: 146/83  Pulse: 85  Temp: 98  F (36.7  C)  Resp: 16  Weight: (!) 163 kg (359 lb 5.6 oz)  SpO2: 96 %      Physical Exam  Constitutional:       General: She is not in acute distress.     Appearance: Normal appearance. She is normal weight. She is not ill-appearing, toxic-appearing or diaphoretic.   HENT:      Head: Normocephalic and atraumatic.      Right Ear: External ear normal.      Left Ear: External ear normal.   Eyes:      Extraocular Movements: Extraocular movements intact.      Conjunctiva/sclera: Conjunctivae normal.      Pupils: Pupils are equal, round, and reactive to light.   Cardiovascular:      Rate and Rhythm: Normal rate.   Pulmonary:      Effort: Pulmonary effort is normal. No respiratory distress.   Musculoskeletal:         General: Normal range of motion.   Skin:     General: Skin is warm and dry.      Coloration: Skin is not jaundiced or pale.   Neurological:      Mental Status: She is alert and oriented to person, place, and time. Mental status is at baseline.      Cranial Nerves: No cranial nerve deficit.   Psychiatric:         Mood and Affect: Mood normal.         ED Course       I have reviewed the epic chart, the nurses note and triage note. vital signs were reviewed.  Discussed with her symptoms are consistent with a muscle strain of the low back.  We discussed symptomatic management and care.  Given the amount of discomfort she is having she is given a short course of hydrocodone purulent.  She is going to increase her Robaxin to a more effective dose given her size.  We discussed and encouraged weight loss to improve her low back pain and future complications.  They are actively trying to get pregnant at this time and she is conscious of taking medications however feeling the risk benefits of the prednisone and hydrocodone would improve her quality of life.           Procedures                No results found for this or any previous visit (from the past 24 hour(s)).    Medications -  No data to display    Assessments & Plan (with Medical Decision Making)     I have reviewed the nursing notes.    I have reviewed the findings, diagnosis, plan and need for follow up with the patient.          New Prescriptions    HYDROCODONE-ACETAMINOPHEN (NORCO) 5-325 MG TABLET    Take 1 tablet by mouth every 6 hours as needed for severe pain (7-10)    PREDNISONE (DELTASONE) 20 MG TABLET    Take two tablets (= 40mg) each day for 5 (five) days       Final diagnoses:   Strain of lumbar region, initial encounter       4/1/2023   [facility]     Jose Ritchie PA-C  04/01/23 [id number]

## 2023-04-09 ENCOUNTER — HEALTH MAINTENANCE LETTER (OUTPATIENT)
Age: 28
End: 2023-04-09

## 2024-04-25 ENCOUNTER — HOSPITAL ENCOUNTER (EMERGENCY)
Facility: HOSPITAL | Age: 29
Discharge: HOME | End: 2024-04-25
Attending: STUDENT IN AN ORGANIZED HEALTH CARE EDUCATION/TRAINING PROGRAM | Admitting: STUDENT IN AN ORGANIZED HEALTH CARE EDUCATION/TRAINING PROGRAM
Payer: COMMERCIAL

## 2024-04-25 VITALS
SYSTOLIC BLOOD PRESSURE: 122 MMHG | RESPIRATION RATE: 18 BRPM | WEIGHT: 293 LBS | HEART RATE: 75 BPM | TEMPERATURE: 98.9 F | DIASTOLIC BLOOD PRESSURE: 82 MMHG | BODY MASS INDEX: 57.02 KG/M2 | OXYGEN SATURATION: 98 %

## 2024-04-25 DIAGNOSIS — M54.50 ACUTE LEFT-SIDED LOW BACK PAIN WITHOUT SCIATICA: ICD-10-CM

## 2024-04-25 DIAGNOSIS — S39.012A BACK STRAIN, INITIAL ENCOUNTER: ICD-10-CM

## 2024-04-25 PROCEDURE — 99284 EMERGENCY DEPT VISIT MOD MDM: CPT

## 2024-04-25 PROCEDURE — 96372 THER/PROPH/DIAG INJ SC/IM: CPT | Performed by: STUDENT IN AN ORGANIZED HEALTH CARE EDUCATION/TRAINING PROGRAM

## 2024-04-25 PROCEDURE — 250N000013 HC RX MED GY IP 250 OP 250 PS 637: Performed by: STUDENT IN AN ORGANIZED HEALTH CARE EDUCATION/TRAINING PROGRAM

## 2024-04-25 PROCEDURE — 99283 EMERGENCY DEPT VISIT LOW MDM: CPT | Performed by: STUDENT IN AN ORGANIZED HEALTH CARE EDUCATION/TRAINING PROGRAM

## 2024-04-25 PROCEDURE — 250N000011 HC RX IP 250 OP 636: Performed by: STUDENT IN AN ORGANIZED HEALTH CARE EDUCATION/TRAINING PROGRAM

## 2024-04-25 RX ORDER — METHOCARBAMOL 750 MG/1
750 TABLET, FILM COATED ORAL 4 TIMES DAILY PRN
Qty: 15 TABLET | Refills: 0 | Status: SHIPPED | OUTPATIENT
Start: 2024-04-25

## 2024-04-25 RX ORDER — IBUPROFEN 800 MG/1
800 TABLET, FILM COATED ORAL EVERY 8 HOURS PRN
Qty: 20 TABLET | Refills: 0 | Status: SHIPPED | OUTPATIENT
Start: 2024-04-25 | End: 2024-05-03

## 2024-04-25 RX ORDER — KETOROLAC TROMETHAMINE 30 MG/ML
30 INJECTION, SOLUTION INTRAMUSCULAR; INTRAVENOUS ONCE
Status: COMPLETED | OUTPATIENT
Start: 2024-04-25 | End: 2024-04-25

## 2024-04-25 RX ORDER — METHOCARBAMOL 750 MG/1
750 TABLET, FILM COATED ORAL ONCE
Status: COMPLETED | OUTPATIENT
Start: 2024-04-25 | End: 2024-04-25

## 2024-04-25 RX ADMIN — METHOCARBAMOL 750 MG: 750 TABLET ORAL at 12:41

## 2024-04-25 RX ADMIN — KETOROLAC TROMETHAMINE 30 MG: 30 INJECTION, SOLUTION INTRAMUSCULAR at 12:41

## 2024-04-25 ASSESSMENT — COLUMBIA-SUICIDE SEVERITY RATING SCALE - C-SSRS
2. HAVE YOU ACTUALLY HAD ANY THOUGHTS OF KILLING YOURSELF IN THE PAST MONTH?: NO
1. IN THE PAST MONTH, HAVE YOU WISHED YOU WERE DEAD OR WISHED YOU COULD GO TO SLEEP AND NOT WAKE UP?: NO
6. HAVE YOU EVER DONE ANYTHING, STARTED TO DO ANYTHING, OR PREPARED TO DO ANYTHING TO END YOUR LIFE?: NO

## 2024-04-25 ASSESSMENT — ACTIVITIES OF DAILY LIVING (ADL)
ADLS_ACUITY_SCORE: 37
ADLS_ACUITY_SCORE: 35

## 2024-04-25 NOTE — DISCHARGE INSTRUCTIONS
May take Ibuprofen 600mg every 6 hours (not to exceed 3,200mg in 24 hours) and/or Tylenol 1000mg every 6 hours (not to exceed 4,000mg in 24 hours) as needed for pain     May alternate each medication every 3 hours. For example, if you took ibuprofen at 9am you could take tylenol at 12pm (noon), then repeat ibuprofen at 3pm and then repeat tylenol at 6pm and so on.      Try topical treatments such as lidocaine patch, topical menthol.    Activity as tolerated.  Use the Robaxin as needed for muscle spasm    Return to the ER for worsening symptoms or new concerning symptoms.  Follow-up with primary care provider as needed for ongoing symptoms

## 2024-04-25 NOTE — ED PROVIDER NOTES
[facility]  ED Provider Note    Chief Complaint   Patient presents with    Back Pain     History:  Johanny Infante is a 29 year old female with previous history of back surgery presents the emergency department today complaining of straining her back trying to get a baby carrier out of the car.  It hurts in the left lower back, is exacerbated by leaning to the side.  No loss of bowel or bladder control.  No pain shooting down the leg.  No other complaints.  This just happened    Review of Systems   Performed; see HPI for pertinent positives and negatives.     Medical history, surgical history, and social history was reviewed.  Nursing documentation, triage note, and vitals were reviewed.    Vitals:  BP: 122/82  Pulse: 75  Temp: 98.9  F (37.2  C)  Resp: 18  Weight: (!) 170.1 kg (375 lb)  SpO2: 98 %    Physical Exam:  Constitutional: Alert and conversant. NAD   HENT: NCAT   Eyes: Normal pupils   Neck: supple   CV: No pallor  Pulmonary/Chest: Non-labored respirations  Abdominal: non-distended   MSK: MUNGUIA.  Tenderness to palpation over the left quadratus lumborum.  Pain is exacerbated by leaning to the left against resistance, relieved by leaning to the right against resistance.  Neuro: Alert and appropriate sensation intact light touch times bilateral lower extremities, symmetrical lower extremity strength 5 out of 5  Skin: Warm and dry. No diaphoresis. No rashes on exposed skin    Psych: Appropriate mood and affect       MDM:      ED Course as of 04/25/24 1426   u Apr 25, 2024   1425 Johanny Infante is a 29 year old female presenting with back pain. Differential includes muscular strain or spasm, degenerative joint and disc disease, acute herniated disc, sciatica, fracture, epidural abscess, discitis, vertebral osteomyelitis, nephrolithiasis, pyelonephritis, malignancy. The character and location of the patient's pain with a benign neurologic exam suggest a low likelihood of malignancy, infectious  process, or fracture, and this is most likely to be a soft tissue musculoskeletal process. It is difficult to differentiate between muscular strain or spasm, degenerative disease, and acute herniated disc. However, emergency department treatment of these symptoms with a benign neurologic exam is the same and does not require emergent diagnostic imaging. The patient was treated with Toradol and Robaxin in the emergency department with improvement in symptoms and is stable for outpatient evaluation and management. Will prescribe robaxin and ibuprofen as needed for pain control and recommend close follow up with a primary care provider. Patient given instructions on follow-up and warning signs for which to return to ED. All questions were answered and the patient is comfortable with plan for discharge. The patient was discharged in stable condition.        Procedures:  Procedures    Impression:  Final diagnoses:   Acute left-sided low back pain without sciatica   Back strain, initial encounter           Luis Martinez MD  04/25/24 0571

## 2024-04-25 NOTE — ED NOTES
Patient wheeled into ED Rm5, c/o low left sided back pain.   Denies numbness or tingling to legs. No loss of bowel or bladder.  She reports she is unable to lift her 3 month old child in his car seat out of the car.  Tylenol or ibuprofen aren't relieving her pain.  Patient is not breastfeeding or pumping at this time.

## 2024-04-25 NOTE — ED TRIAGE NOTES
Patient present with complaints of back pain that she states started when she was trying to get the car seat out of the car and it was caught. So she just turned funny.

## 2024-06-15 ENCOUNTER — HEALTH MAINTENANCE LETTER (OUTPATIENT)
Age: 29
End: 2024-06-15

## 2025-06-21 ENCOUNTER — HEALTH MAINTENANCE LETTER (OUTPATIENT)
Age: 30
End: 2025-06-21